# Patient Record
Sex: FEMALE | Race: OTHER | HISPANIC OR LATINO
[De-identification: names, ages, dates, MRNs, and addresses within clinical notes are randomized per-mention and may not be internally consistent; named-entity substitution may affect disease eponyms.]

---

## 2019-02-13 ENCOUNTER — TRANSCRIPTION ENCOUNTER (OUTPATIENT)
Age: 29
End: 2019-02-13

## 2020-02-18 ENCOUNTER — RESULT REVIEW (OUTPATIENT)
Age: 30
End: 2020-02-18

## 2021-02-23 ENCOUNTER — RESULT REVIEW (OUTPATIENT)
Age: 31
End: 2021-02-23

## 2021-05-27 PROBLEM — Z00.00 ENCOUNTER FOR PREVENTIVE HEALTH EXAMINATION: Status: ACTIVE | Noted: 2021-05-27

## 2021-06-02 ENCOUNTER — ASOB RESULT (OUTPATIENT)
Age: 31
End: 2021-06-02

## 2021-06-02 ENCOUNTER — APPOINTMENT (OUTPATIENT)
Dept: ANTEPARTUM | Facility: CLINIC | Age: 31
End: 2021-06-02
Payer: COMMERCIAL

## 2021-06-02 PROCEDURE — 76805 OB US >/= 14 WKS SNGL FETUS: CPT

## 2021-06-16 ENCOUNTER — APPOINTMENT (OUTPATIENT)
Dept: ANTEPARTUM | Facility: CLINIC | Age: 31
End: 2021-06-16
Payer: COMMERCIAL

## 2021-06-16 ENCOUNTER — ASOB RESULT (OUTPATIENT)
Age: 31
End: 2021-06-16

## 2021-06-16 PROCEDURE — 76816 OB US FOLLOW-UP PER FETUS: CPT

## 2021-08-23 ENCOUNTER — ASOB RESULT (OUTPATIENT)
Age: 31
End: 2021-08-23

## 2021-08-23 ENCOUNTER — NON-APPOINTMENT (OUTPATIENT)
Age: 31
End: 2021-08-23

## 2021-08-23 ENCOUNTER — APPOINTMENT (OUTPATIENT)
Dept: ANTEPARTUM | Facility: CLINIC | Age: 31
End: 2021-08-23
Payer: COMMERCIAL

## 2021-08-23 PROCEDURE — 76816 OB US FOLLOW-UP PER FETUS: CPT

## 2021-10-11 ENCOUNTER — OUTPATIENT (OUTPATIENT)
Dept: INPATIENT UNIT | Facility: HOSPITAL | Age: 31
LOS: 1 days | Discharge: HOME | End: 2021-10-11

## 2021-10-11 VITALS — DIASTOLIC BLOOD PRESSURE: 75 MMHG | SYSTOLIC BLOOD PRESSURE: 139 MMHG | HEART RATE: 75 BPM

## 2021-10-11 VITALS
RESPIRATION RATE: 17 BRPM | TEMPERATURE: 98 F | OXYGEN SATURATION: 100 % | DIASTOLIC BLOOD PRESSURE: 76 MMHG | SYSTOLIC BLOOD PRESSURE: 144 MMHG | HEART RATE: 66 BPM

## 2021-10-11 DIAGNOSIS — O26.899 OTHER SPECIFIED PREGNANCY RELATED CONDITIONS, UNSPECIFIED TRIMESTER: ICD-10-CM

## 2021-10-11 DIAGNOSIS — Z3A.00 WEEKS OF GESTATION OF PREGNANCY NOT SPECIFIED: ICD-10-CM

## 2021-10-11 DIAGNOSIS — Z90.89 ACQUIRED ABSENCE OF OTHER ORGANS: Chronic | ICD-10-CM

## 2021-10-11 NOTE — OB PROVIDER TRIAGE NOTE - NSOBPROVIDERNOTE_OBGYN_ALL_OB_FT
30 year old female P0 at 39.2 weeks gestation early labor    NST reactive  BPP 8/8    case d/w Dr Garcia   discharge home 30 year old female P0 at 39.2 weeks gestation early labor    NST reactive  BPP 8/8    case d/w Dr Garcia   discharge home  instructions given   s/s of labor reviewed     Luis Daniel PRESLEY

## 2021-10-11 NOTE — OB PROVIDER TRIAGE NOTE - NSHPPHYSICALEXAM_GEN_ALL_CORE
pt seen and examined    ICU Vital Signs Last 24 Hrs  T(C): 36.6 (11 Oct 2021 13:50), Max: 36.6 (11 Oct 2021 13:50)  T(F): 97.9 (11 Oct 2021 13:50), Max: 97.9 (11 Oct 2021 13:50)  HR: 75 (11 Oct 2021 14:54) (66 - 75)  BP: 139/75 (11 Oct 2021 14:54) (133/81 - 144/85)  BP(mean): --  ABP: --  ABP(mean): --  RR: 17 (11 Oct 2021 13:50) (17 - 17)  SpO2: 100% (11 Oct 2021 13:50) (100% - 100%)  pt alert OX3   lungs clear   heart s1 s2  abd soft   placed on efm

## 2021-10-11 NOTE — OB PROVIDER TRIAGE NOTE - HISTORY OF PRESENT ILLNESS
30 year old female P0 at 39.2 weeks who presents with contractions q 5 minutes and stated feeling Nausea and emesis this am    able to tolerate Gatoraide  in triage     denied any ap issues denied any fetal issues     medhx denied  surghx tonsillectomy 2000

## 2021-10-12 ENCOUNTER — TRANSCRIPTION ENCOUNTER (OUTPATIENT)
Age: 31
End: 2021-10-12

## 2021-10-13 ENCOUNTER — INPATIENT (INPATIENT)
Facility: HOSPITAL | Age: 31
LOS: 3 days | Discharge: HOME | End: 2021-10-17
Attending: SPECIALIST | Admitting: SPECIALIST
Payer: COMMERCIAL

## 2021-10-13 ENCOUNTER — RESULT REVIEW (OUTPATIENT)
Age: 31
End: 2021-10-13

## 2021-10-13 ENCOUNTER — APPOINTMENT (OUTPATIENT)
Dept: ANTEPARTUM | Facility: HOSPITAL | Age: 31
End: 2021-10-13

## 2021-10-13 ENCOUNTER — TRANSCRIPTION ENCOUNTER (OUTPATIENT)
Age: 31
End: 2021-10-13

## 2021-10-13 VITALS
SYSTOLIC BLOOD PRESSURE: 157 MMHG | HEART RATE: 74 BPM | DIASTOLIC BLOOD PRESSURE: 86 MMHG | RESPIRATION RATE: 15 BRPM | TEMPERATURE: 98 F

## 2021-10-13 DIAGNOSIS — O14.90 UNSPECIFIED PRE-ECLAMPSIA, UNSPECIFIED TRIMESTER: ICD-10-CM

## 2021-10-13 DIAGNOSIS — Z3A.00 WEEKS OF GESTATION OF PREGNANCY NOT SPECIFIED: ICD-10-CM

## 2021-10-13 DIAGNOSIS — O26.899 OTHER SPECIFIED PREGNANCY RELATED CONDITIONS, UNSPECIFIED TRIMESTER: ICD-10-CM

## 2021-10-13 DIAGNOSIS — O14.93 UNSPECIFIED PRE-ECLAMPSIA, THIRD TRIMESTER: ICD-10-CM

## 2021-10-13 DIAGNOSIS — Z90.89 ACQUIRED ABSENCE OF OTHER ORGANS: Chronic | ICD-10-CM

## 2021-10-13 PROBLEM — Z78.9 OTHER SPECIFIED HEALTH STATUS: Chronic | Status: ACTIVE | Noted: 2021-10-11

## 2021-10-13 LAB
ALBUMIN SERPL ELPH-MCNC: 3 G/DL — LOW (ref 3.3–5)
ALBUMIN SERPL ELPH-MCNC: 3.2 G/DL — LOW (ref 3.3–5)
ALBUMIN SERPL ELPH-MCNC: 3.8 G/DL — SIGNIFICANT CHANGE UP (ref 3.3–5)
ALBUMIN SERPL ELPH-MCNC: 4 G/DL — SIGNIFICANT CHANGE UP (ref 3.3–5)
ALP SERPL-CCNC: 118 U/L — SIGNIFICANT CHANGE UP (ref 40–120)
ALP SERPL-CCNC: 129 U/L — HIGH (ref 40–120)
ALP SERPL-CCNC: 150 U/L — HIGH (ref 40–120)
ALP SERPL-CCNC: 160 U/L — HIGH (ref 40–120)
ALT FLD-CCNC: 176 U/L — HIGH (ref 4–33)
ALT FLD-CCNC: 245 U/L — HIGH (ref 4–33)
ALT FLD-CCNC: 248 U/L — HIGH (ref 4–33)
ALT FLD-CCNC: 266 U/L — HIGH (ref 4–33)
ANION GAP SERPL CALC-SCNC: 14 MMOL/L — SIGNIFICANT CHANGE UP (ref 7–14)
ANION GAP SERPL CALC-SCNC: 15 MMOL/L — HIGH (ref 7–14)
ANION GAP SERPL CALC-SCNC: 19 MMOL/L — HIGH (ref 7–14)
ANION GAP SERPL CALC-SCNC: 19 MMOL/L — HIGH (ref 7–14)
ANISOCYTOSIS BLD QL: SLIGHT — SIGNIFICANT CHANGE UP
APPEARANCE UR: CLEAR — SIGNIFICANT CHANGE UP
APTT BLD: 22.9 SEC — LOW (ref 27–36.3)
APTT BLD: 23.8 SEC — LOW (ref 27–36.3)
APTT BLD: 24.4 SEC — LOW (ref 27–36.3)
APTT BLD: 26 SEC — LOW (ref 27–36.3)
AST SERPL-CCNC: 160 U/L — HIGH (ref 4–32)
AST SERPL-CCNC: 264 U/L — HIGH (ref 4–32)
AST SERPL-CCNC: 332 U/L — HIGH (ref 4–32)
AST SERPL-CCNC: 343 U/L — HIGH (ref 4–32)
BACTERIA # UR AUTO: ABNORMAL
BASOPHILS # BLD AUTO: 0.02 K/UL — SIGNIFICANT CHANGE UP (ref 0–0.2)
BASOPHILS # BLD AUTO: 0.03 K/UL — SIGNIFICANT CHANGE UP (ref 0–0.2)
BASOPHILS # BLD AUTO: 0.03 K/UL — SIGNIFICANT CHANGE UP (ref 0–0.2)
BASOPHILS # BLD AUTO: 0.04 K/UL — SIGNIFICANT CHANGE UP (ref 0–0.2)
BASOPHILS NFR BLD AUTO: 0.2 % — SIGNIFICANT CHANGE UP (ref 0–2)
BASOPHILS NFR BLD AUTO: 0.2 % — SIGNIFICANT CHANGE UP (ref 0–2)
BASOPHILS NFR BLD AUTO: 0.3 % — SIGNIFICANT CHANGE UP (ref 0–2)
BASOPHILS NFR BLD AUTO: 0.4 % — SIGNIFICANT CHANGE UP (ref 0–2)
BILIRUB SERPL-MCNC: 0.7 MG/DL — SIGNIFICANT CHANGE UP (ref 0.2–1.2)
BILIRUB SERPL-MCNC: 0.8 MG/DL — SIGNIFICANT CHANGE UP (ref 0.2–1.2)
BILIRUB UR-MCNC: NEGATIVE — SIGNIFICANT CHANGE UP
BLD GP AB SCN SERPL QL: NEGATIVE — SIGNIFICANT CHANGE UP
BUN SERPL-MCNC: 10 MG/DL — SIGNIFICANT CHANGE UP (ref 7–23)
BUN SERPL-MCNC: 11 MG/DL — SIGNIFICANT CHANGE UP (ref 7–23)
BUN SERPL-MCNC: 14 MG/DL — SIGNIFICANT CHANGE UP (ref 7–23)
BUN SERPL-MCNC: 9 MG/DL — SIGNIFICANT CHANGE UP (ref 7–23)
CALCIUM SERPL-MCNC: 6.5 MG/DL — CRITICAL LOW (ref 8.4–10.5)
CALCIUM SERPL-MCNC: 7 MG/DL — LOW (ref 8.4–10.5)
CALCIUM SERPL-MCNC: 8.3 MG/DL — LOW (ref 8.4–10.5)
CALCIUM SERPL-MCNC: 9.5 MG/DL — SIGNIFICANT CHANGE UP (ref 8.4–10.5)
CHLORIDE SERPL-SCNC: 100 MMOL/L — SIGNIFICANT CHANGE UP (ref 98–107)
CHLORIDE SERPL-SCNC: 102 MMOL/L — SIGNIFICANT CHANGE UP (ref 98–107)
CHLORIDE SERPL-SCNC: 103 MMOL/L — SIGNIFICANT CHANGE UP (ref 98–107)
CHLORIDE SERPL-SCNC: 104 MMOL/L — SIGNIFICANT CHANGE UP (ref 98–107)
CO2 SERPL-SCNC: 13 MMOL/L — LOW (ref 22–31)
CO2 SERPL-SCNC: 14 MMOL/L — LOW (ref 22–31)
CO2 SERPL-SCNC: 17 MMOL/L — LOW (ref 22–31)
CO2 SERPL-SCNC: 18 MMOL/L — LOW (ref 22–31)
COLOR SPEC: YELLOW — SIGNIFICANT CHANGE UP
COVID-19 SPIKE DOMAIN AB INTERP: NEGATIVE — SIGNIFICANT CHANGE UP
COVID-19 SPIKE DOMAIN ANTIBODY RESULT: 0.4 U/ML — SIGNIFICANT CHANGE UP
CREAT ?TM UR-MCNC: 180 MG/DL — SIGNIFICANT CHANGE UP
CREAT SERPL-MCNC: 0.87 MG/DL — SIGNIFICANT CHANGE UP (ref 0.5–1.3)
CREAT SERPL-MCNC: 0.88 MG/DL — SIGNIFICANT CHANGE UP (ref 0.5–1.3)
CREAT SERPL-MCNC: 0.93 MG/DL — SIGNIFICANT CHANGE UP (ref 0.5–1.3)
CREAT SERPL-MCNC: 0.93 MG/DL — SIGNIFICANT CHANGE UP (ref 0.5–1.3)
DIFF PNL FLD: ABNORMAL
EOSINOPHIL # BLD AUTO: 0 K/UL — SIGNIFICANT CHANGE UP (ref 0–0.5)
EOSINOPHIL # BLD AUTO: 0.31 K/UL — SIGNIFICANT CHANGE UP (ref 0–0.5)
EOSINOPHIL NFR BLD AUTO: 0 % — SIGNIFICANT CHANGE UP (ref 0–6)
EOSINOPHIL NFR BLD AUTO: 2.6 % — SIGNIFICANT CHANGE UP (ref 0–6)
EPI CELLS # UR: 5 /HPF — SIGNIFICANT CHANGE UP (ref 0–5)
FIBRINOGEN PPP-MCNC: 478 MG/DL — SIGNIFICANT CHANGE UP (ref 290–520)
FIBRINOGEN PPP-MCNC: 631 MG/DL — HIGH (ref 290–520)
FIBRINOGEN PPP-MCNC: 687 MG/DL — HIGH (ref 290–520)
GLUCOSE SERPL-MCNC: 137 MG/DL — HIGH (ref 70–99)
GLUCOSE SERPL-MCNC: 153 MG/DL — HIGH (ref 70–99)
GLUCOSE SERPL-MCNC: 77 MG/DL — SIGNIFICANT CHANGE UP (ref 70–99)
GLUCOSE SERPL-MCNC: 85 MG/DL — SIGNIFICANT CHANGE UP (ref 70–99)
GLUCOSE UR QL: NEGATIVE — SIGNIFICANT CHANGE UP
HCT VFR BLD CALC: 22.8 % — LOW (ref 34.5–45)
HCT VFR BLD CALC: 25.9 % — LOW (ref 34.5–45)
HCT VFR BLD CALC: 31.9 % — LOW (ref 34.5–45)
HCT VFR BLD CALC: 34.3 % — LOW (ref 34.5–45)
HGB BLD-MCNC: 10.2 G/DL — LOW (ref 11.5–15.5)
HGB BLD-MCNC: 11 G/DL — LOW (ref 11.5–15.5)
HGB BLD-MCNC: 7.6 G/DL — LOW (ref 11.5–15.5)
HGB BLD-MCNC: 8.3 G/DL — LOW (ref 11.5–15.5)
HYALINE CASTS # UR AUTO: 6 /LPF — SIGNIFICANT CHANGE UP (ref 0–7)
IANC: 10.12 K/UL — HIGH (ref 1.5–8.5)
IANC: 10.14 K/UL — HIGH (ref 1.5–8.5)
IANC: 7.43 K/UL — SIGNIFICANT CHANGE UP (ref 1.5–8.5)
IANC: 8.4 K/UL — SIGNIFICANT CHANGE UP (ref 1.5–8.5)
IMM GRANULOCYTES NFR BLD AUTO: 1.2 % — SIGNIFICANT CHANGE UP (ref 0–1.5)
IMM GRANULOCYTES NFR BLD AUTO: 1.2 % — SIGNIFICANT CHANGE UP (ref 0–1.5)
IMM GRANULOCYTES NFR BLD AUTO: 1.4 % — SIGNIFICANT CHANGE UP (ref 0–1.5)
IMM GRANULOCYTES NFR BLD AUTO: 2.4 % — HIGH (ref 0–1.5)
INR BLD: 0.96 RATIO — SIGNIFICANT CHANGE UP (ref 0.88–1.16)
INR BLD: 1.02 RATIO — SIGNIFICANT CHANGE UP (ref 0.88–1.16)
INR BLD: 1.03 RATIO — SIGNIFICANT CHANGE UP (ref 0.88–1.16)
INR BLD: 1.13 RATIO — SIGNIFICANT CHANGE UP (ref 0.88–1.16)
KETONES UR-MCNC: ABNORMAL
LDH SERPL L TO P-CCNC: 419 U/L — HIGH (ref 135–225)
LDH SERPL L TO P-CCNC: 555 U/L — HIGH (ref 135–225)
LDH SERPL L TO P-CCNC: 767 U/L — HIGH (ref 135–225)
LDH SERPL L TO P-CCNC: 790 U/L — HIGH (ref 135–225)
LEUKOCYTE ESTERASE UR-ACNC: NEGATIVE — SIGNIFICANT CHANGE UP
LG PLATELETS BLD QL AUTO: SLIGHT — SIGNIFICANT CHANGE UP
LYMPHOCYTES # BLD AUTO: 0.54 K/UL — LOW (ref 1–3.3)
LYMPHOCYTES # BLD AUTO: 0.55 K/UL — LOW (ref 1–3.3)
LYMPHOCYTES # BLD AUTO: 0.84 K/UL — LOW (ref 1–3.3)
LYMPHOCYTES # BLD AUTO: 1.14 K/UL — SIGNIFICANT CHANGE UP (ref 1–3.3)
LYMPHOCYTES # BLD AUTO: 10.9 % — LOW (ref 13–44)
LYMPHOCYTES # BLD AUTO: 4.5 % — LOW (ref 13–44)
LYMPHOCYTES # BLD AUTO: 4.7 % — LOW (ref 13–44)
LYMPHOCYTES # BLD AUTO: 9.1 % — LOW (ref 13–44)
MAGNESIUM SERPL-MCNC: 5.6 MG/DL — HIGH (ref 1.6–2.6)
MAGNESIUM SERPL-MCNC: 6.5 MG/DL — HIGH (ref 1.6–2.6)
MAGNESIUM SERPL-MCNC: 6.6 MG/DL — HIGH (ref 1.6–2.6)
MANUAL SMEAR VERIFICATION: SIGNIFICANT CHANGE UP
MCHC RBC-ENTMCNC: 27.5 PG — SIGNIFICANT CHANGE UP (ref 27–34)
MCHC RBC-ENTMCNC: 27.6 PG — SIGNIFICANT CHANGE UP (ref 27–34)
MCHC RBC-ENTMCNC: 28 PG — SIGNIFICANT CHANGE UP (ref 27–34)
MCHC RBC-ENTMCNC: 28.1 PG — SIGNIFICANT CHANGE UP (ref 27–34)
MCHC RBC-ENTMCNC: 32 GM/DL — SIGNIFICANT CHANGE UP (ref 32–36)
MCHC RBC-ENTMCNC: 32 GM/DL — SIGNIFICANT CHANGE UP (ref 32–36)
MCHC RBC-ENTMCNC: 32.1 GM/DL — SIGNIFICANT CHANGE UP (ref 32–36)
MCHC RBC-ENTMCNC: 33.3 GM/DL — SIGNIFICANT CHANGE UP (ref 32–36)
MCV RBC AUTO: 84.4 FL — SIGNIFICANT CHANGE UP (ref 80–100)
MCV RBC AUTO: 86 FL — SIGNIFICANT CHANGE UP (ref 80–100)
MCV RBC AUTO: 86.2 FL — SIGNIFICANT CHANGE UP (ref 80–100)
MCV RBC AUTO: 87.5 FL — SIGNIFICANT CHANGE UP (ref 80–100)
MICROCYTES BLD QL: SLIGHT — SIGNIFICANT CHANGE UP
MONOCYTES # BLD AUTO: 0.72 K/UL — SIGNIFICANT CHANGE UP (ref 0–0.9)
MONOCYTES # BLD AUTO: 0.73 K/UL — SIGNIFICANT CHANGE UP (ref 0–0.9)
MONOCYTES # BLD AUTO: 0.74 K/UL — SIGNIFICANT CHANGE UP (ref 0–0.9)
MONOCYTES # BLD AUTO: 0.93 K/UL — HIGH (ref 0–0.9)
MONOCYTES NFR BLD AUTO: 6.3 % — SIGNIFICANT CHANGE UP (ref 2–14)
MONOCYTES NFR BLD AUTO: 6.9 % — SIGNIFICANT CHANGE UP (ref 2–14)
MONOCYTES NFR BLD AUTO: 7.7 % — SIGNIFICANT CHANGE UP (ref 2–14)
MONOCYTES NFR BLD AUTO: 8 % — SIGNIFICANT CHANGE UP (ref 2–14)
NEUTROPHILS # BLD AUTO: 10.12 K/UL — HIGH (ref 1.8–7.4)
NEUTROPHILS # BLD AUTO: 10.14 K/UL — HIGH (ref 1.8–7.4)
NEUTROPHILS # BLD AUTO: 7.43 K/UL — HIGH (ref 1.8–7.4)
NEUTROPHILS # BLD AUTO: 8.4 K/UL — HIGH (ref 1.8–7.4)
NEUTROPHILS NFR BLD AUTO: 80.2 % — HIGH (ref 43–77)
NEUTROPHILS NFR BLD AUTO: 80.6 % — HIGH (ref 43–77)
NEUTROPHILS NFR BLD AUTO: 83.8 % — HIGH (ref 43–77)
NEUTROPHILS NFR BLD AUTO: 87.4 % — HIGH (ref 43–77)
NITRITE UR-MCNC: NEGATIVE — SIGNIFICANT CHANGE UP
NRBC # BLD: 0 /100 WBCS — SIGNIFICANT CHANGE UP
NRBC # FLD: 0 K/UL — SIGNIFICANT CHANGE UP
NRBC # FLD: 0.02 K/UL — HIGH
PH UR: 6 — SIGNIFICANT CHANGE UP (ref 5–8)
PLAT MORPH BLD: ABNORMAL
PLATELET # BLD AUTO: 34 K/UL — LOW (ref 150–400)
PLATELET # BLD AUTO: 47 K/UL — LOW (ref 150–400)
PLATELET # BLD AUTO: 47 K/UL — LOW (ref 150–400)
PLATELET # BLD AUTO: 70 K/UL — LOW (ref 150–400)
PLATELET COUNT - ESTIMATE: ABNORMAL
POTASSIUM SERPL-MCNC: 4 MMOL/L — SIGNIFICANT CHANGE UP (ref 3.5–5.3)
POTASSIUM SERPL-MCNC: 4.1 MMOL/L — SIGNIFICANT CHANGE UP (ref 3.5–5.3)
POTASSIUM SERPL-MCNC: 4.4 MMOL/L — SIGNIFICANT CHANGE UP (ref 3.5–5.3)
POTASSIUM SERPL-MCNC: 4.8 MMOL/L — SIGNIFICANT CHANGE UP (ref 3.5–5.3)
POTASSIUM SERPL-SCNC: 4 MMOL/L — SIGNIFICANT CHANGE UP (ref 3.5–5.3)
POTASSIUM SERPL-SCNC: 4.1 MMOL/L — SIGNIFICANT CHANGE UP (ref 3.5–5.3)
POTASSIUM SERPL-SCNC: 4.4 MMOL/L — SIGNIFICANT CHANGE UP (ref 3.5–5.3)
POTASSIUM SERPL-SCNC: 4.8 MMOL/L — SIGNIFICANT CHANGE UP (ref 3.5–5.3)
PROT ?TM UR-MCNC: 68 MG/DL — SIGNIFICANT CHANGE UP
PROT ?TM UR-MCNC: 68 MG/DL — SIGNIFICANT CHANGE UP
PROT SERPL-MCNC: 5.1 G/DL — LOW (ref 6–8.3)
PROT SERPL-MCNC: 5.2 G/DL — LOW (ref 6–8.3)
PROT SERPL-MCNC: 6.6 G/DL — SIGNIFICANT CHANGE UP (ref 6–8.3)
PROT SERPL-MCNC: 6.7 G/DL — SIGNIFICANT CHANGE UP (ref 6–8.3)
PROT UR-MCNC: ABNORMAL
PROT/CREAT UR-RTO: 0.4 RATIO — HIGH (ref 0–0.2)
PROTHROM AB SERPL-ACNC: 11.1 SEC — SIGNIFICANT CHANGE UP (ref 10.6–13.6)
PROTHROM AB SERPL-ACNC: 11.6 SEC — SIGNIFICANT CHANGE UP (ref 10.6–13.6)
PROTHROM AB SERPL-ACNC: 11.8 SEC — SIGNIFICANT CHANGE UP (ref 10.6–13.6)
PROTHROM AB SERPL-ACNC: 12.8 SEC — SIGNIFICANT CHANGE UP (ref 10.6–13.6)
RBC # BLD: 2.7 M/UL — LOW (ref 3.8–5.2)
RBC # BLD: 2.96 M/UL — LOW (ref 3.8–5.2)
RBC # BLD: 3.71 M/UL — LOW (ref 3.8–5.2)
RBC # BLD: 3.98 M/UL — SIGNIFICANT CHANGE UP (ref 3.8–5.2)
RBC # FLD: 13.8 % — SIGNIFICANT CHANGE UP (ref 10.3–14.5)
RBC # FLD: 14.1 % — SIGNIFICANT CHANGE UP (ref 10.3–14.5)
RBC # FLD: 14.2 % — SIGNIFICANT CHANGE UP (ref 10.3–14.5)
RBC # FLD: 14.2 % — SIGNIFICANT CHANGE UP (ref 10.3–14.5)
RBC BLD AUTO: ABNORMAL
RBC CASTS # UR COMP ASSIST: 0 /HPF — SIGNIFICANT CHANGE UP (ref 0–4)
RH IG SCN BLD-IMP: POSITIVE — SIGNIFICANT CHANGE UP
RH IG SCN BLD-IMP: POSITIVE — SIGNIFICANT CHANGE UP
SARS-COV-2 IGG+IGM SERPL QL IA: 0.4 U/ML — SIGNIFICANT CHANGE UP
SARS-COV-2 IGG+IGM SERPL QL IA: NEGATIVE — SIGNIFICANT CHANGE UP
SARS-COV-2 RNA SPEC QL NAA+PROBE: SIGNIFICANT CHANGE UP
SODIUM SERPL-SCNC: 132 MMOL/L — LOW (ref 135–145)
SODIUM SERPL-SCNC: 134 MMOL/L — LOW (ref 135–145)
SODIUM SERPL-SCNC: 136 MMOL/L — SIGNIFICANT CHANGE UP (ref 135–145)
SODIUM SERPL-SCNC: 136 MMOL/L — SIGNIFICANT CHANGE UP (ref 135–145)
SP GR SPEC: 1.03 — SIGNIFICANT CHANGE UP (ref 1–1.05)
T PALLIDUM AB TITR SER: NEGATIVE — SIGNIFICANT CHANGE UP
URATE SERPL-MCNC: 5.4 MG/DL — SIGNIFICANT CHANGE UP (ref 2.5–7)
URATE SERPL-MCNC: 5.8 MG/DL — SIGNIFICANT CHANGE UP (ref 2.5–7)
URATE SERPL-MCNC: 5.9 MG/DL — SIGNIFICANT CHANGE UP (ref 2.5–7)
URATE SERPL-MCNC: 6.2 MG/DL — SIGNIFICANT CHANGE UP (ref 2.5–7)
UROBILINOGEN FLD QL: SIGNIFICANT CHANGE UP
WBC # BLD: 10.43 K/UL — SIGNIFICANT CHANGE UP (ref 3.8–10.5)
WBC # BLD: 11.58 K/UL — HIGH (ref 3.8–10.5)
WBC # BLD: 12.09 K/UL — HIGH (ref 3.8–10.5)
WBC # BLD: 9.26 K/UL — SIGNIFICANT CHANGE UP (ref 3.8–10.5)
WBC # FLD AUTO: 10.43 K/UL — SIGNIFICANT CHANGE UP (ref 3.8–10.5)
WBC # FLD AUTO: 11.58 K/UL — HIGH (ref 3.8–10.5)
WBC # FLD AUTO: 12.09 K/UL — HIGH (ref 3.8–10.5)
WBC # FLD AUTO: 9.26 K/UL — SIGNIFICANT CHANGE UP (ref 3.8–10.5)
WBC UR QL: 7 /HPF — HIGH (ref 0–5)

## 2021-10-13 PROCEDURE — 99222 1ST HOSP IP/OBS MODERATE 55: CPT

## 2021-10-13 PROCEDURE — 88307 TISSUE EXAM BY PATHOLOGIST: CPT | Mod: 26

## 2021-10-13 RX ORDER — LANOLIN
1 OINTMENT (GRAM) TOPICAL EVERY 6 HOURS
Refills: 0 | Status: DISCONTINUED | OUTPATIENT
Start: 2021-10-13 | End: 2021-10-17

## 2021-10-13 RX ORDER — SODIUM CHLORIDE 9 MG/ML
1000 INJECTION, SOLUTION INTRAVENOUS
Refills: 0 | Status: DISCONTINUED | OUTPATIENT
Start: 2021-10-13 | End: 2021-10-14

## 2021-10-13 RX ORDER — CITRIC ACID/SODIUM CITRATE 300-500 MG
30 SOLUTION, ORAL ORAL ONCE
Refills: 0 | Status: COMPLETED | OUTPATIENT
Start: 2021-10-13 | End: 2021-10-13

## 2021-10-13 RX ORDER — SODIUM CHLORIDE 9 MG/ML
1000 INJECTION, SOLUTION INTRAVENOUS
Refills: 0 | Status: DISCONTINUED | OUTPATIENT
Start: 2021-10-13 | End: 2021-10-13

## 2021-10-13 RX ORDER — ACETAMINOPHEN 500 MG
3 TABLET ORAL
Qty: 0 | Refills: 0 | DISCHARGE
Start: 2021-10-13

## 2021-10-13 RX ORDER — OXYTOCIN 10 UNIT/ML
333.33 VIAL (ML) INJECTION
Qty: 20 | Refills: 0 | Status: DISCONTINUED | OUTPATIENT
Start: 2021-10-13 | End: 2021-10-13

## 2021-10-13 RX ORDER — MAGNESIUM SULFATE 500 MG/ML
4 VIAL (ML) INJECTION ONCE
Refills: 0 | Status: COMPLETED | OUTPATIENT
Start: 2021-10-13 | End: 2021-10-13

## 2021-10-13 RX ORDER — FAMOTIDINE 10 MG/ML
20 INJECTION INTRAVENOUS ONCE
Refills: 0 | Status: COMPLETED | OUTPATIENT
Start: 2021-10-13 | End: 2021-10-13

## 2021-10-13 RX ORDER — SODIUM CHLORIDE 9 MG/ML
500 INJECTION, SOLUTION INTRAVENOUS ONCE
Refills: 0 | Status: COMPLETED | OUTPATIENT
Start: 2021-10-13 | End: 2021-10-13

## 2021-10-13 RX ORDER — ACETAMINOPHEN 500 MG
975 TABLET ORAL
Refills: 0 | Status: DISCONTINUED | OUTPATIENT
Start: 2021-10-13 | End: 2021-10-13

## 2021-10-13 RX ORDER — MAGNESIUM SULFATE 500 MG/ML
2 VIAL (ML) INJECTION
Qty: 40 | Refills: 0 | Status: DISCONTINUED | OUTPATIENT
Start: 2021-10-13 | End: 2021-10-13

## 2021-10-13 RX ORDER — SODIUM CHLORIDE 9 MG/ML
1000 INJECTION, SOLUTION INTRAVENOUS ONCE
Refills: 0 | Status: COMPLETED | OUTPATIENT
Start: 2021-10-13 | End: 2021-10-13

## 2021-10-13 RX ORDER — DIPHENHYDRAMINE HCL 50 MG
25 CAPSULE ORAL EVERY 6 HOURS
Refills: 0 | Status: DISCONTINUED | OUTPATIENT
Start: 2021-10-13 | End: 2021-10-17

## 2021-10-13 RX ORDER — ACETAMINOPHEN 500 MG
1000 TABLET ORAL ONCE
Refills: 0 | Status: COMPLETED | OUTPATIENT
Start: 2021-10-13 | End: 2021-10-13

## 2021-10-13 RX ORDER — OXYCODONE HYDROCHLORIDE 5 MG/1
5 TABLET ORAL
Refills: 0 | Status: COMPLETED | OUTPATIENT
Start: 2021-10-13 | End: 2021-10-20

## 2021-10-13 RX ORDER — MAGNESIUM SULFATE 500 MG/ML
2 VIAL (ML) INJECTION
Qty: 40 | Refills: 0 | Status: DISCONTINUED | OUTPATIENT
Start: 2021-10-13 | End: 2021-10-14

## 2021-10-13 RX ORDER — MAGNESIUM HYDROXIDE 400 MG/1
30 TABLET, CHEWABLE ORAL
Refills: 0 | Status: DISCONTINUED | OUTPATIENT
Start: 2021-10-13 | End: 2021-10-17

## 2021-10-13 RX ORDER — DIPHENHYDRAMINE HCL 50 MG
25 CAPSULE ORAL ONCE
Refills: 0 | Status: DISCONTINUED | OUTPATIENT
Start: 2021-10-13 | End: 2021-10-13

## 2021-10-13 RX ORDER — NIFEDIPINE 30 MG
30 TABLET, EXTENDED RELEASE 24 HR ORAL DAILY
Refills: 0 | Status: DISCONTINUED | OUTPATIENT
Start: 2021-10-13 | End: 2021-10-13

## 2021-10-13 RX ORDER — OXYTOCIN 10 UNIT/ML
16.67 VIAL (ML) INJECTION
Qty: 20 | Refills: 0 | Status: DISCONTINUED | OUTPATIENT
Start: 2021-10-13 | End: 2021-10-14

## 2021-10-13 RX ORDER — TETANUS TOXOID, REDUCED DIPHTHERIA TOXOID AND ACELLULAR PERTUSSIS VACCINE, ADSORBED 5; 2.5; 8; 8; 2.5 [IU]/.5ML; [IU]/.5ML; UG/.5ML; UG/.5ML; UG/.5ML
0.5 SUSPENSION INTRAMUSCULAR ONCE
Refills: 0 | Status: DISCONTINUED | OUTPATIENT
Start: 2021-10-13 | End: 2021-10-17

## 2021-10-13 RX ORDER — OXYCODONE HYDROCHLORIDE 5 MG/1
5 TABLET ORAL ONCE
Refills: 0 | Status: DISCONTINUED | OUTPATIENT
Start: 2021-10-13 | End: 2021-10-15

## 2021-10-13 RX ORDER — NIFEDIPINE 30 MG
30 TABLET, EXTENDED RELEASE 24 HR ORAL DAILY
Refills: 0 | Status: DISCONTINUED | OUTPATIENT
Start: 2021-10-14 | End: 2021-10-14

## 2021-10-13 RX ORDER — DIPHENHYDRAMINE HCL 50 MG
25 CAPSULE ORAL ONCE
Refills: 0 | Status: COMPLETED | OUTPATIENT
Start: 2021-10-13 | End: 2021-10-13

## 2021-10-13 RX ORDER — SIMETHICONE 80 MG/1
80 TABLET, CHEWABLE ORAL EVERY 4 HOURS
Refills: 0 | Status: DISCONTINUED | OUTPATIENT
Start: 2021-10-13 | End: 2021-10-17

## 2021-10-13 RX ORDER — DIPHENOXYLATE HCL/ATROPINE 2.5-.025MG
2 TABLET ORAL ONCE
Refills: 0 | Status: DISCONTINUED | OUTPATIENT
Start: 2021-10-13 | End: 2021-10-13

## 2021-10-13 RX ADMIN — Medication 50 MILLIUNIT(S)/MIN: at 15:49

## 2021-10-13 RX ADMIN — Medication 300 GRAM(S): at 06:47

## 2021-10-13 RX ADMIN — Medication 25 MILLIGRAM(S): at 05:20

## 2021-10-13 RX ADMIN — Medication 50 GM/HR: at 07:11

## 2021-10-13 RX ADMIN — FAMOTIDINE 20 MILLIGRAM(S): 10 INJECTION INTRAVENOUS at 13:37

## 2021-10-13 RX ADMIN — FAMOTIDINE 20 MILLIGRAM(S): 10 INJECTION INTRAVENOUS at 07:57

## 2021-10-13 RX ADMIN — SODIUM CHLORIDE 1000 MILLILITER(S): 9 INJECTION, SOLUTION INTRAVENOUS at 08:39

## 2021-10-13 RX ADMIN — Medication 400 MILLIGRAM(S): at 17:45

## 2021-10-13 RX ADMIN — Medication 2 TABLET(S): at 16:00

## 2021-10-13 RX ADMIN — SODIUM CHLORIDE 50 MILLILITER(S): 9 INJECTION, SOLUTION INTRAVENOUS at 08:39

## 2021-10-13 RX ADMIN — SODIUM CHLORIDE 2000 MILLILITER(S): 9 INJECTION, SOLUTION INTRAVENOUS at 04:53

## 2021-10-13 RX ADMIN — Medication 30 MILLILITER(S): at 13:37

## 2021-10-13 RX ADMIN — Medication 50 GM/HR: at 19:18

## 2021-10-13 RX ADMIN — Medication 1000 MILLIGRAM(S): at 18:00

## 2021-10-13 RX ADMIN — Medication 30 MILLIGRAM(S): at 06:55

## 2021-10-13 NOTE — OB RN DELIVERY SUMMARY - NSSELHIDDEN_OBGYN_ALL_OB_FT
[NS_DeliveryAttending1_OBGYN_ALL_OB_FT:[id number]],[NS_DeliveryAssist1_OBGYN_ALL_OB_FT:[id number]],[NS_DeliveryRN_OBGYN_ALL_OB_FT:[id number]]

## 2021-10-13 NOTE — CHART NOTE - NSCHARTNOTEFT_GEN_A_CORE
R4  Patient seen at bedside with attending. Explained dx of HELLP to patient and importance of SICU evaluation.  Plts currently downtrending, most recently 34 despite transfusion of 1u plt. LFTs uptrending, most recently 332/248. Fibrinogen also downtrending, 478.  Tachycardia improving. Normotensive and adequate UOP at this time.  SICU coming for evaluation. Will transfuse 2u PRBCs, 2u plts, 2 FFP. Will continue to trend labs awaiting SICU evaluation.    VILLA Zhang PGY4  seen with Dr. Fleming R4  Patient seen at bedside with attending. Explained dx of HELLP to patient and importance of SICU evaluation.  Plts currently downtrending, most recently 34 despite transfusion of 1u plt. LFTs uptrending, most recently 332/248. Fibrinogen also downtrending, 478.  Tachycardia improving. Normotensive and adequate UOP at this time.  Patient evaluated by SICU and will be taken to SICU when a bed becomes available.  In the meantime, will transfuse 2u PRBCs, 2u plts, 2 FFP. Per SICU, will repeat labs 2h after completion of PRBC and Plt transfusion, and q4h thereafter.  Strict Is/Os  All questions answered.    VILLA Zhang PGY4  seen with Dr. Fleming  pt d/w MFM at safety rounds

## 2021-10-13 NOTE — OB PROVIDER H&P - PROBLEM SELECTOR PLAN 1
d/w Dr Frankel/Dr Crawford admit for labile BP, elevated LFTs n/v for Magnesium, procardia IOL @ 39.4wks  Magnesium bolus and continuous infusion for PEC  Procardia for PEC  for PO Cytotec for IOL  covid sent  prenatals reviewed  pain management as needed  repeat HELLP labs  monitor BPs

## 2021-10-13 NOTE — OB PROVIDER DELIVERY SUMMARY - NSSELHIDDEN_OBGYN_ALL_OB_FT
[NS_DeliveryAttending1_OBGYN_ALL_OB_FT:[id number]],[NS_DeliveryAssist1_OBGYN_ALL_OB_FT:[id number]],[NS_DeliveryRN_OBGYN_ALL_OB_FT:[id number]==]

## 2021-10-13 NOTE — OB PROVIDER LABOR PROGRESS NOTE - ASSESSMENT
HELLP syndrome; Dr Dean was updated on patient's status; anesthesiologist Dr Lobo was updated; will evaluate the patient at bedside.  Continue labor induction with PO Cytotec.    Janice Ocasio CNM

## 2021-10-13 NOTE — PROGRESS NOTE ADULT - SUBJECTIVE AND OBJECTIVE BOX
M Fellow Consult Note    HPI: 29yo P1 at 39w4d who is currently undergoing an IOL for severe preeclampsia/HELLP. M consulted for role of expedited delivery given still early in induction process and her laboratories are worsening. In review she presented to triage this monday w/ symptoms of nausea and vomiting in addition to labor complaints. She had normal to mild range blood pressures in triage. PEC labs were not sent. Fetal status was reassuring on EFM. She was discharged home w/ precautions. She was managing her nausea/vomiting w/ zofran at home but was becoming worse last night into today which prompted her presentation. Here she continued to complain of n/v/abd pain. Labs were significant for thrombocytopenia 70, LFTs 150/160 Hct 34.3. She had severe range pressures at this time recieved procardia 30mg XLShe received an epidural at that time and iol was started she is still unfavorable w/ cervix ft-1cm. Repeat labs significant for worsening thrombocytopenia to 47, Na 132, LFTs 264/245. On our evaluation pt continues w/ abdominal pain and nausea w/o toleration of any PO since yesterday evening. +FM denies LOF/VB. Denies ha/cov.        Physical Exam  ICU Vital Signs Last 24 Hrs  T(C): 36.6 (13 Oct 2021 10:59), Max: 37.0 (13 Oct 2021 07:00)  T(F): 97.88 (13 Oct 2021 10:59), Max: 98.6 (13 Oct 2021 07:00)  HR: 113 (13 Oct 2021 13:38) (70 - 123)  BP: 139/84 (13 Oct 2021 13:29) (120/62 - 168/80)  BP(mean): --  ABP: --  ABP(mean): --  RR: 18 (13 Oct 2021 07:05) (15 - 18)  SpO2: 98% (13 Oct 2021 13:38) (98% - 100%)  Gen: well appearing  Abd: firm uterus, palpable pain  Neuro: +4 reflexes bilaterally               10.2   9.26  )-----------( 47       ( 13 Oct 2021 11:08 )             31.9     10-13    132<L>  |  100  |  11  ----------------------------<  77  4.4   |  18<L>  |  0.88    Ca    8.3<L>      13 Oct 2021 11:08  Mg     5.60     10-13    TPro  6.7  /  Alb  4.0  /  TBili  0.8  /  DBili  x   /  AST  264<H>  /  ALT  245<H>  /  AlkPhos  160<H>  10-13      Bedside sonogram:  No free fluid at renal/hepatic interface. No evidence of hypoechoic regions retroplacentally.

## 2021-10-13 NOTE — OB PROVIDER TRIAGE NOTE - NSHPPHYSICALEXAM_GEN_ALL_CORE
TAS: vertex  SVE: 0.5/50/-3  FHT: moderate variability, + accels, negative decels  toco: irreg q 4-6 minutes  Vital Signs Last 24 Hrs  T(C): 36.5 (13 Oct 2021 03:49), Max: 36.5 (13 Oct 2021 03:49)  T(F): 97.7 (13 Oct 2021 03:49), Max: 97.7 (13 Oct 2021 03:49)  HR: 76 (13 Oct 2021 04:26) (74 - 76)  BP: 149/87 (13 Oct 2021 04:11) (149/87 - 157/86)  BP(mean): --  RR: 15 (13 Oct 2021 03:49) (15 - 15)  SpO2: -- abd soft gravid NT  LS clear bilaterally  CV RRR  TAS: vertex  posterior placenta  BPP 8/8  RUTH:12.6  SVE: 0.5/50/-3  FHT: moderate variability, + accels, negative decels  toco: irreg q 4-6 minutes  Vital Signs Last 24 Hrs  T(C): 36.5 (13 Oct 2021 03:49), Max: 36.5 (13 Oct 2021 03:49)  T(F): 97.7 (13 Oct 2021 03:49), Max: 97.7 (13 Oct 2021 03:49)  HR: 76 (13 Oct 2021 04:26) (74 - 76)  BP: 149/87 (13 Oct 2021 04:11) (149/87 - 157/86)  BP(mean): --  RR: 15 (13 Oct 2021 03:49) (15 - 15)  SpO2: --

## 2021-10-13 NOTE — DISCHARGE NOTE OB - MATERIALS PROVIDED
Vaccinations/[facility]  Screening Program/  Immunization Record/Breastfeeding Log/Bottle Feeding Log/Breastfeeding Mother’s Support Group Information/Guide to Postpartum Care/[facility] Hearing Screen Program/Back To Sleep Handout/Shaken Baby Prevention Handout/Breastfeeding Guide and Packet/Birth Certificate Instructions/Discharge Medication Information for Patients and Families Pocket Guide

## 2021-10-13 NOTE — CONSULT NOTE ADULT - ASSESSMENT
ASSESSMENT:  {{0:name}} is a {{1:age}} {{2:gender}} with history of ****. Presented with **** on ****. Now POD# **** from ****. SICU consulted for ****.     PLAN:  NEURO:  -SAT/RASS/CAM-ICU/pain  -monitor mental status  -tylenol q6  -toradol per primary team  -oxy 5-10 mg q4 prn  -dilaudid for breakthrough pain  -OOBTC if able  -Imaging?  -Sedation?    RESPIRATORY:   -monitor respiratory status  -continuous pulse oxt  -SBT?/P:F?  -PT/OT orders?  -f/u abg? CXR?  -OOBTC?    CARDIOVASCULAR:  -echo?  -home meds?  -pressors?  -monitor VS and perfusion status  -trend lactate?    GI/NUTRITION:  -NG/OG tube? output?  -feeds? - may start CLD day of surgery often (give ensure x1 with meal)  -antiemetic?  -GI ppx? (if intubated >48 hrs)  -BM/flatus?  -if not s/p abd surgery, may start bowel regimen (ie docusate 100mg bid or miralax)    GENITOURINARY/RENAL:  -output? -UOP>30cc/hr?  -howard? DC howard day 1 or 2 if able  -IV fluids? - may generally dc if eating  -monitor electrolytes  -CrCl?    HEMATOLOGIC:  -monitor H&H  -DVT ppx? - SCDs vs heparin/lovenox    INFECTIOUS DISEASE:  -abx? start and stop date?  -BCx/UCx/suputum - w/ dates  -monitor for fevers; trend WBC    ENDOCRINE:  -monitor glucose - how often?  -glucose trend?  -ISS? pre-meal? basal?    Lines:  PIV x ?  howard? w/ date  a-line? w/ date  CVC? w/ date  Drains? w/ date  Wound/skin?    GOC/CODE STATUS:  -DNR/DNI?    DISPO: SICU   ASSESSMENT:  29 yo w/ no significant PMH, currently  s/p spontaneous vaginal delivery at 39 wks following induction for severe pre-eclampsia/HELLP syndrome tonight (10/13). Pt w/ symptoms of N/V progressing to development of LUQ abd pain today (10/13), prompting her admission to L&D. Initial labs included thrombocytopenia, transaminitis, and anemia, raising concern for pre-ecclampsia/HELLP syndrome, for which the pt was ultimately induced. Following delivery pt remained hemodynamically stable, however repeat labs demonstrated worsening lab abnormalities, for which the SICU was consulted for hemodynamic monitoring.    PLAN:  NEURO:  -A&Ox4; denies HA/vision changes  -monitor mental status  -epidural in place (keeping in place in setting of thrombocytopenia)  -mag gtt - goal serum mag 4-7 mg/dL    RESPIRATORY:   -monitor respiratory status  -continuous pulse oxt  -monitor for pulmonary edema    CARDIOVASCULAR:  -monitor VS and perfusion status  -procardia 30 mg daily in setting of concern for pre-eclampsia    GI/NUTRITION:  -transaminitis; denies abd pain at this time  -CLD  -mylicon/mag hydroxide  -monitor for abd pain  -trend LFTs    GENITOURINARY/RENAL:  -s/p 2L IVF during delivery  -howard in place  -monitor UOP/Cr  -LR @ 75 cc/hr  -monitor electrolytes; q6 mag  -serum mag goal 4-7 mg/dL    HEMATOLOGIC:  -s/p 3u plt and 1u prbc  -hgb trending down 10.2>8.3>7.6;   -heme consulted; goal plt >20,000; goal hgb >7  -SCDs  -holding DVT ppx   -q6 cbc; trend PT/PTT/fibrinogen/LDH    INFECTIOUS DISEASE:  -monitor for fevers; trend WBC    ENDOCRINE:  -trend glucose    Lines:  PIV x 2  howard  epidural in place    GOC/CODE STATUS:  Full Code    DISPO: SICU     ASSESSMENT:  31 yo w/ no significant PMH, currently  s/p spontaneous vaginal delivery at 39 wks following induction for severe pre-eclampsia/HELLP syndrome tonight (10/13). Pt w/ symptoms of N/V progressing to development of LUQ abd pain today (10/13), prompting her admission to L&D. Initial labs included thrombocytopenia, transaminitis, and anemia, raising concern for pre-ecclampsia/HELLP syndrome, for which the pt was ultimately induced. Following delivery pt remained hemodynamically stable, however repeat labs demonstrated worsening lab abnormalities, for which the SICU was consulted for hemodynamic monitoring.    PLAN:  NEURO:  -A&Ox4; denies HA/vision changes  -monitor mental status  -epidural in place (keeping in place in setting of thrombocytopenia)  -mag gtt - goal serum mag 4-7 mg/dL    RESPIRATORY:   -monitor respiratory status  -continuous pulse oxt  -monitor for pulmonary edema    CARDIOVASCULAR:  -monitor VS and perfusion status  -procardia 30 mg daily in setting of concern for pre-eclampsia    GI/NUTRITION:  -transaminitis; denies abd pain at this time  -CLD  -mylicon/mag hydroxide  -monitor for abd pain  -trend LFTs    GENITOURINARY/RENAL:  -s/p 2L IVF during delivery  -howard in place  -monitor UOP/Cr  -LR @ 75 cc/hr  -monitor electrolytes; q6 mag  -serum mag goal 4-7 mg/dL    HEMATOLOGIC:  -s/p 3u plt and 1u prbc  -hgb trending down 10.2>8.3>7.6;   -heme consulted; goal plt >20,000; goal hgb >7  -SCDs  -holding DVT ppx   -q6 cbc; trend PT/PTT/fibrinogen/LDH    INFECTIOUS DISEASE:  -monitor for fevers; trend WBC    ENDOCRINE:  -trend glucose    Lines:  PIV x 2  howard  epidural in place    GOC/CODE STATUS:  Full Code    DISPO: SICU

## 2021-10-13 NOTE — CHART NOTE - NSCHARTNOTEFT_GEN_A_CORE
31yo F  39.4wks who presented with contractions. On admission found to have SBP 150s, Hb 11, Plt 70 (prev Plt 210 21) Tbili 0.7, AST//176, . Concern for pre-eclampsia vs HELLP, s/p vaginal delivery 2:30PM. EBL 1100ml. Hematology consulted for worsening HELLP labs    Rule out HELLP  - worsening HELLP labs  - Hb 11-> 7.6  - Plt 210 -> 34  - AST//176 -> 332/248  -  -> 790   - coags grossly wnl, fibrinogen 478; low suspicion for DIC          - in HELLP, transfuse for Hb <7, Plt <20  - send haptoglobin, retic  - peripheral smear    Discussed with on call attending *** 29yo F  39.4wks who presented with contractions. On admission found to have SBP 150s, Hb 11, Plt 70 (prev Plt 210 21) Tbili 0.7, AST//176, . Concern for pre-eclampsia vs HELLP, s/p vaginal delivery 2:30PM. EBL 1100ml. Hematology consulted for worsening anemia/thrombocytopenia    Anemia  Thrombocytopenia  - worsening HELLP labs:      - Hb 11-> 7.6      - Plt 210 -> 34      - AST//176 -> 332/248      -  -> 790   - normal bili  - coags grossly wnl, fibrinogen 478; low suspicion for DIC  - peripheral smear with 1-3 schistocytes, true thrombocytopenia  - does not appear to be TMA, more consistent with HELLP    - management of HELLP involves delivery which has already happened at 10/13 2:30pm  - in HELLP lab values may worsen initially 48h after delivery  - recommend trend CBC, LDH, LFTs q12h  - recommend transfuse for Hb <7, Plt <20  - send haptoglobin, retic    Discussed with on call attending Dr Fleming. 31yo F  39.4wks who presented with contractions. On admission found to have SBP 150s, Hb 11, Plt 70 (prev Plt 210 21) Tbili 0.7, AST//176, . Concern for pre-eclampsia vs HELLP, s/p vaginal delivery 2:30PM. EBL 1100ml. Hematology consulted for worsening anemia/thrombocytopenia    Anemia  Thrombocytopenia  - worsening HELLP labs:      - Hb 11-> 7.6      - Plt 210 -> 34      - AST//176 -> 332/248      -  -> 790   - normal bili  - coags grossly wnl, fibrinogen 478; low suspicion for DIC  - peripheral smear with 1-3 schistocytes, true thrombocytopenia  - does not appear to be TMA, more consistent with HELLP    - management of HELLP involves delivery which has already happened at 10/13 2:30pm  - in HELLP lab values may worsen initially 48h after delivery  - recommend trend CBC, LDH, LFTs q12h  - recommend transfuse for Hb <7, Plt <20  - send haptoglobin, retic    Discussed with on call attending Dr Fleming and SICU and Obstetrics team

## 2021-10-13 NOTE — OB PROVIDER DELIVERY SUMMARY - NSPROVIDERDELIVERYNOTE_OBGYN_ALL_OB_FT
Writer called and spoke with the patient. Patient stated that he has not had labs drawn since July or later of this year. Patient stated that he will go to [facility] lab to have them done, prior to visit on 10/29/2020.     Delivery of viable male infant, apgars 8,9, weight 3690g, cephalic presentation.   Grossly normal uterus, bilateral fallopian tubes and ovaries.   Uterine atony resolved with additional pitocin and IM hemabate x1.   QBL 1100ml, IVF 2000ml, UOP 150ml.

## 2021-10-13 NOTE — DISCHARGE NOTE OB - PATIENT PORTAL LINK FT
You can access the FollowMyHealth Patient Portal offered by [facility] by registering at the following website: [email or web address]. By joining [employer]’s FollowMyHealth portal, you will also be able to view your health information using other applications (apps) compatible with our system.

## 2021-10-13 NOTE — OB PROVIDER LABOR PROGRESS NOTE - NS_SUBJECTIVE/OBJECTIVE_OBGYN_ALL_OB_FT
Patient was admitted for labor induction due to preeclampsia.  Patient c/o epigastric pain  ICU Vital Signs:  T(C): 37 (13 Oct 2021 07:05), Max: 37.0 (13 Oct 2021 07:00)  T(F): 98.6 (13 Oct 2021 07:05), Max: 98.6 (13 Oct 2021 07:00)  HR: 78 (13 Oct 2021 08:02) (70 - 92)  BP: 133/70 (13 Oct 2021 07:55) (133/70 - 168/80)  RR: 18 (13 Oct 2021 07:05) (15 - 18)  SpO2: 100% (13 Oct 2021 07:52) (98% - 100%)  HELLP labs:                        11.0   10.43 )-----------( 70       ( 13 Oct 2021 05:08 )             34.3   10-13    136  |  103  |  14  ----------------------------<  85  4.8   |  14<L>  |  0.93    Ca    9.5      13 Oct 2021 05:08    TPro  6.6  /  Alb  3.8  /  TBili  0.7  /  DBili  x   /  AST  160<H>  /  ALT  176<H>  /  AlkPhos  150<H>  10-13

## 2021-10-13 NOTE — CHART NOTE - NSCHARTNOTEFT_GEN_A_CORE
PA Note    patient seen at bedside. Counseled about cervical balloon for IOL  patient prior nurse in L&D at [facility]  stated reasoning for expediting delivery given HELLP syndrome   patient expressed understanding but declined cervical balloon at this time    dw Dr Jermaine bella

## 2021-10-13 NOTE — OB NEONATOLOGY/PEDIATRICIAN DELIVERY SUMMARY - NSPEDSNEONOTESA_OBGYN_ALL_OB_FT
Baby is a 39.4 wk GA male born to a 31 y/o  mother via C/S. PEDS called to delivery for HELLP and mother on magnesium. Maternal history uncomplicated. Prenatal history notable for elevated liver enzymes and low platelets of 47k. Maternal blood type O+. PNL negative, non-reactive, and immune. GBS negative on . AROM at time of delivery, clear fluids. Baby born vigorous and crying spontaneously. Warmed, dried, stimulated. Apgars 8/9. EOS _____. Mom plans to breastfeed/bottlefeed and consents/declines hepB. Circ requested. Baby is a 39.4 wk GA male born to a 31 y/o  mother via C/S. PEDS called to delivery for HELLP and mother on magnesium. Maternal history uncomplicated. Prenatal history notable for elevated liver enzymes and low platelets of 47k. Maternal blood type O+. PNL negative, non-reactive, and immune. GBS negative on . AROM at time of delivery, clear fluids. Baby born vigorous and crying spontaneously. Warmed, dried, stimulated. Apgars 8/9. EOS 0.04, maternal Tmax 37. Mom plans to breastfeed and consents hepB. Circ requested.

## 2021-10-13 NOTE — OB PROVIDER TRIAGE NOTE - HISTORY OF PRESENT ILLNESS
31 yo  @ 39.4 wks complaining of contractions increasing since 8-9pm, unable to walk or sit, pt reports no relief of firmness of abdomen in between contractions. denies vb or lof, reports +GFM. AP course uncomplicated thus far. last seen by OB last week, last here in D&T Monday VE cervix closed. EFW 7#15.    GBS: negative  meds:PNV  All: denies  PMH: denies  PSH: tonsillectomy   gyn hx: denies  ob hx: denies 31 yo  @ 39.4 wks complaining of contractions increasing since 8-9pm, unable to walk or sit, pt reports no relief of firmness of abdomen in between contractions. denies vb or lof, reports +GFM. AP course uncomplicated thus far. denies fever chills ha new swelling vision changes cp sob or cough. last seen by OB last week, last here in D&T Monday VE cervix closed. EFW 7#15.    GBS: negative  meds: PNV  All: denies  PMH: denies  PSH: tonsillectomy   gyn hx: denies  ob hx: denies

## 2021-10-13 NOTE — DISCHARGE NOTE OB - CARE PROVIDER_API CALL
Zach Black  OBSTETRICS AND GYNECOLOGY  [address] [place] [address], [address]  [place] [address]  Phone: [phone]  Fax: [phone]  Follow Up Time:

## 2021-10-13 NOTE — OB PROVIDER TRIAGE NOTE - NSOBPROVIDERNOTE_OBGYN_ALL_OB_FT
d/w Dr Crawford- pt discomfort 7/10 but feels no relief in between contractions, nausea and vomiting since Monday not relieved by zofran since last evening, unable to keep anything down.  plan:  IV fluid bolus for unable to tolerate oral intake  Benadryl for discomfort  HELLP labs for labile /86, 149/87 29 yo  @ 39.4 wks complaining of contractions increasing since 8-9pm, unable to walk or sit, pt reports no relief of firmness of abdomen in between contractions. denies vb or lof, reports +GFM. AP course uncomplicated thus far. denies fever chills ha new swelling vision changes cp sob or cough. last seen by OB last week, last here in D&T Monday VE cervix closed. EFW 7#15.    GBS: negative  meds: PNV  All: denies  PMH: denies  PSH: tonsillectomy   gyn hx: denies  ob hx: denies    d/w Dr Crawford- pt discomfort 7/10 but feels no relief in between contractions, nausea and vomiting since Monday not relieved by zofran since last evening, unable to keep anything down.  plan:  IV fluid bolus for unable to tolerate oral intake  Benadryl for discomfort  HELLP labs for labile /86, 149/87    d/w Dr Frankel admit for labile BP, elevated LFTs n/v for Magnesium, procardia IOL @ 39.4wks  Magnesium bolus and continuous infusion for PEC  Procardia for PEC  for PO Cytotec for IOL  covid sent  prenatals reviewed  pain management as needed  repeat HELLP labs

## 2021-10-13 NOTE — OB RN DELIVERY SUMMARY - NS_SEPSISRSKCALC_OBGYN_ALL_OB_FT
EOS calculated successfully. EOS Risk Factor: 0.5/1000 live births ([facility] national incidence); GA=39w4d; Temp=98.6; ROM=0.017; GBS='Negative'; Antibiotics='No antibiotics or any antibiotics < 2 hrs prior to birth'

## 2021-10-13 NOTE — OB PROVIDER TRIAGE NOTE - NSHPLABSRESULTS_GEN_ALL_CORE
11.0   10.43 )-----------( x        ( 13 Oct 2021 05:08 )             34.3   10-13    136  |  103  |  14  ----------------------------<  85  4.8   |  14<L>  |  0.93    Ca    9.5      13 Oct 2021 05:08    TPro  6.6  /  Alb  3.8  /  TBili  0.7  /  DBili  x   /  AST  160<H>  /  ALT  176<H>  /  AlkPhos  150<H>  10-13  PT/INR - ( 13 Oct 2021 05:08 )   PT: 11.6 sec;   INR: 1.02 ratio         PTT - ( 13 Oct 2021 05:08 )  PTT:23.8 sec

## 2021-10-13 NOTE — DISCHARGE NOTE OB - CARE PLAN
1 Principal Discharge DX:	Single delivery by  section  Assessment and plan of treatment:	return to ofice 1 week  Secondary Diagnosis:	Hypertension in pregnancy, preeclampsia, severe, third trimester  Assessment and plan of treatment:	return to office 1 week

## 2021-10-13 NOTE — OB POSTPARTUM EVENT NOTE - NS_EVENTPTSUMMARY1_OBGYN_ALL_OB_FT
BP:  Hr 125  o2 sat 99  temp 37.1    Fundus firm @- mod bleeding  QBL 1100  OR fluids 2000 BP:117/104  Hr 125  o2 sat 99  temp 37.1    Fundus firm @- mod bleeding  QBL 1100  OR fluids 2000

## 2021-10-13 NOTE — CONSULT NOTE ADULT - SUBJECTIVE AND OBJECTIVE BOX
SICU CONSULT NOTE    HPI  31 yo w/ no significant PMH, currently  s/p spontaneous vaginal delivery at 39 wks following induction for severe pre-eclampsia/HELLP syndrome tonight (10/13). Pt w/ symptoms of N/V progressing to development of LUQ abd pain today, prompting her admission to L&D today. Initial labs included thrombocytopenia, transaminitis, and anemia, raising concern for pre-ecclampsia/HELLP syndrome, for which the pt was ultimately induced. Of note, pt was given 1 unit of platelets prior to delivery.    Intrapartum, pt had estimated blood loss of 1100 cc and was given 2 IL IVF. Delivery itself was otherwise uncomplicated with the exception of tachycardia to 120-130s, however post-partum lab work reflected worsening thrombocytopenia, transaminitis, and anemia. Platelets trended downward to 34 from 47, hgb trended from 10.2 to 7.6. Labs were otherwise significant for AST//266, and . Cr was 0.93 and had UOP of 285 cc since delivery. For concern for pre-eclampsia, pt was given procardia 30 mg x 1 and started on a magnesium infusion. Pt initially pending 2u prbc, 2u platelets, and 2u FFP per OB attending.    At time of initial examination for SICU consultation, pt resting comfortably in bed. VSS, w/ pt normotensive and HR 80s to 90s. 97% on RA. She denies N/V, abd pain, HA, vision changes, CP, and SOB. Given concern for further decompensation with worsening lab abnormalities, pt accepted to SICU for ongoing management.     PAST MEDICAL HISTORY: No pertinent past medical history    PAST SURGICAL HISTORY: S/P tonsillectomy    CODE STATUS:     HOME MEDICATIONS:  ALLERGIES: No Known Allergies    VITAL SIGNS:  ICU Vital Signs Last 24 Hrs  T(C): 37.1 (13 Oct 2021 15:15), Max: 37.1 (13 Oct 2021 15:15)  T(F): 98.8 (13 Oct 2021 15:15), Max: 98.8 (13 Oct 2021 15:15)  HR: 109 (13 Oct 2021 22:00) (70 - 137)  BP: 121/84 (13 Oct 2021 22:00) (59/41 - 168/80)  BP(mean): 90 (13 Oct 2021 22:00) (35 - 141)  ABP: --  ABP(mean): --  RR: 23 (13 Oct 2021 22:00) (15 - 26)  SpO2: 99% (13 Oct 2021 22:00) (98% - 100%)    NEURO  Exam:  acetaminophen   Tablet .. 975 milliGRAM(s) Oral <User Schedule>  diphenhydrAMINE 25 milliGRAM(s) Oral every 6 hours PRN Pruritus  magnesium sulfate Infusion 2 Gm/Hr IV Continuous <Continuous>  oxyCODONE    IR 5 milliGRAM(s) Oral once PRN Moderate to Severe Pain (4-10)  oxyCODONE    IR 5 milliGRAM(s) Oral every 3 hours PRN Moderate to Severe Pain (4-10)    RESPIRATORY    CARDIOVASCULAR    GI/NUTRITION  Exam:  Diet:  magnesium hydroxide Suspension 30 milliLiter(s) Oral two times a day PRN Constipation  simethicone 80 milliGRAM(s) Chew every 4 hours PRN Gas    GENITOURINARY/RENAL  lactated ringers. 1000 milliLiter(s) IV Continuous <Continuous>  oxytocin Infusion 16.667 milliUNIT(s)/Min IV Continuous <Continuous>    10-13 @ 07:  -  10-13 @ 22:19  --------------------------------------------------------  IN:    IV PiggyBack: 2000 mL    IV PiggyBack: 100 mL    Lactated Ringers: 300 mL    Lactated Ringers Bolus: 500 mL    Magnesium Sulfate: 300 mL    Magnesium Sulfate: 400 mL    Oxytocin: 300 mL  Total IN: 3900 mL    OUT:    Blood Loss (mL): 1100 mL    Indwelling Catheter - Urethral (mL): 2070 mL  Total OUT: 3170 mL    Total NET: 730 mL    Weight (kg): 64 (10-13 @ 07:05)  10-13    136  |  104  |  9   ----------------------------<  153<H>  4.1   |  17<L>  |  0.87    Ca    6.5<LL>      13 Oct 2021 20:03  Mg     6.60     10-13    TPro  5.1<L>  /  Alb  3.0<L>  /  TBili  0.8  /  DBili  x   /  AST  332<H>  /  ALT  248<H>  /  AlkPhos  118  10-13    [ ] Arreaga catheter, indication: urine output monitoring in critically ill patient    HEMATOLOGIC  [ ] VTE Prophylaxis:                        7.6    11.58 )-----------( 34       ( 13 Oct 2021 20:03 )             22.8     PT/INR - ( 13 Oct 2021 20:09 )   PT: 12.8 sec;   INR: 1.13 ratio         PTT - ( 13 Oct 2021 20:09 )  PTT:26.0 sec  Transfusion: [ ] PRBC	[ ] Platelets	[ ] FFP	[ ] Cryoprecipitate      INFECTIOUS DISEASES  diphtheria/tetanus/pertussis (acellular) Vaccine (ADAcel) 0.5 milliLiter(s) IntraMuscular once    RECENT CULTURES:    ENDOCRINE    CAPILLARY BLOOD GLUCOSE    PATIENT CARE ACCESS DEVICES:  [X] Peripheral IV x 2 18g  [ ] Central Venous Line	[ ] R	[ ] L	[ ] IJ	[ ] Fem	[ ] SC	Placed:   [ ] Arterial Line		[ ] R	[ ] L	[ ] Fem	[ ] Rad	[ ] Ax	Placed:   [ ] PICC:					[ ] Mediport  [ ] Urinary Catheter, Date Placed:   [x] Necessity of urinary, arterial, and venous catheters discussed    OTHER MEDICATIONS: lanolin Ointment 1 Application(s) Topical every 6 hours PRN    IMAGING STUDIES: SICU CONSULT NOTE    HPI  31 yo w/ no significant PMH, currently  s/p spontaneous vaginal delivery at 39 wks following induction for severe pre-eclampsia/HELLP syndrome tonight (10/13). Pt w/ symptoms of N/V progressing to development of LUQ abd pain today, prompting her admission to L&D today. Initial labs included thrombocytopenia, transaminitis, and anemia, raising concern for pre-ecclampsia/HELLP syndrome, for which the pt was ultimately induced. Of note, pt was given 1 unit of platelets prior to delivery.    Intrapartum, pt had estimated blood loss of 1100 cc and was given 2 IL IVF. Delivery itself was otherwise uncomplicated with the exception of tachycardia to 120-130s, however post-partum lab work reflected worsening thrombocytopenia, transaminitis, and anemia. Platelets trended downward to 34 from 47, hgb trended from 10.2 to 7.6. Labs were otherwise significant for AST//266, and . Cr was 0.93 and had UOP of 285 cc since delivery. For concern for pre-eclampsia, pt was given procardia 30 mg x 1 and started on a magnesium infusion. Pt initially pending 2u prbc, 2u platelets, and 2u FFP per OB attending.    At time of initial examination for SICU consultation, pt resting comfortably in bed. VSS, w/ pt normotensive and HR 80s to 90s. 97% on RA. She denies N/V, abd pain, HA, vision changes, CP, and SOB. Given concern for further decompensation with worsening lab abnormalities, pt accepted to SICU for ongoing management.     PAST MEDICAL HISTORY: No pertinent past medical history    PAST SURGICAL HISTORY: S/P tonsillectomy    HOME MEDICATIONS:  ALLERGIES: No Known Allergies    VITAL SIGNS:  ICU Vital Signs Last 24 Hrs  T(C): 37.1 (13 Oct 2021 15:15), Max: 37.1 (13 Oct 2021 15:15)  T(F): 98.8 (13 Oct 2021 15:15), Max: 98.8 (13 Oct 2021 15:15)  HR: 109 (13 Oct 2021 22:00) (70 - 137)  BP: 121/84 (13 Oct 2021 22:00) (59/41 - 168/80)  BP(mean): 90 (13 Oct 2021 22:00) (35 - 141)  ABP: --  ABP(mean): --  RR: 23 (13 Oct 2021 22:00) (15 - 26)  SpO2: 99% (13 Oct 2021 22:00) (98% - 100%)    Gen: A&Ox4, NAD  HEENT: Atraumatic. Mucous membranes moist, no scleral icterus. Pale conjunctiva  CV: RRR . No murmurs.  1+ bilat LE edema.  Distal pulses 2+. Capillary refill < 2 seconds.  Resp: Respirations unlabored. CTAB, no rales, rhonchi, or wheezes.  GI: Abdomen gravid, soft, mild tenderness over fundus, otherwise non tender to palpation. + BS.  Skin/MSK: No open wounds. No ecchymosis appreciated. No rashes.  Neuro: Following commands. Moving extremities spontaneously.  Psych: Appropriate mood, cooperative  NEURO  Exam:  acetaminophen   Tablet .. 975 milliGRAM(s) Oral <User Schedule>  diphenhydrAMINE 25 milliGRAM(s) Oral every 6 hours PRN Pruritus  magnesium sulfate Infusion 2 Gm/Hr IV Continuous <Continuous>  oxyCODONE    IR 5 milliGRAM(s) Oral once PRN Moderate to Severe Pain (4-10)  oxyCODONE    IR 5 milliGRAM(s) Oral every 3 hours PRN Moderate to Severe Pain (4-10)    RESPIRATORY    CARDIOVASCULAR    GI/NUTRITION  Exam:  Diet:  magnesium hydroxide Suspension 30 milliLiter(s) Oral two times a day PRN Constipation  simethicone 80 milliGRAM(s) Chew every 4 hours PRN Gas    GENITOURINARY/RENAL  lactated ringers. 1000 milliLiter(s) IV Continuous <Continuous>  oxytocin Infusion 16.667 milliUNIT(s)/Min IV Continuous <Continuous>    10-13 @ 07:  -  10-13 @ 22:19  --------------------------------------------------------  IN:    IV PiggyBack: 2000 mL    IV PiggyBack: 100 mL    Lactated Ringers: 300 mL    Lactated Ringers Bolus: 500 mL    Magnesium Sulfate: 300 mL    Magnesium Sulfate: 400 mL    Oxytocin: 300 mL  Total IN: 3900 mL    OUT:    Blood Loss (mL): 1100 mL    Indwelling Catheter - Urethral (mL): 2070 mL  Total OUT: 3170 mL    Total NET: 730 mL    Weight (kg): 64 (10-13 @ 07:05)  10-13    136  |  104  |  9   ----------------------------<  153<H>  4.1   |  17<L>  |  0.87    Ca    6.5<LL>      13 Oct 2021 20:03  Mg     6.60     10-13    TPro  5.1<L>  /  Alb  3.0<L>  /  TBili  0.8  /  DBili  x   /  AST  332<H>  /  ALT  248<H>  /  AlkPhos  118  10-13    [ ] Arreaga catheter, indication: urine output monitoring in critically ill patient    HEMATOLOGIC  [ ] VTE Prophylaxis:                        7.6    11.58 )-----------( 34       ( 13 Oct 2021 20:03 )             22.8     PT/INR - ( 13 Oct 2021 20:09 )   PT: 12.8 sec;   INR: 1.13 ratio         PTT - ( 13 Oct 2021 20:09 )  PTT:26.0 sec  Transfusion: [ ] PRBC	[ ] Platelets	[ ] FFP	[ ] Cryoprecipitate      INFECTIOUS DISEASES  diphtheria/tetanus/pertussis (acellular) Vaccine (ADAcel) 0.5 milliLiter(s) IntraMuscular once    RECENT CULTURES:    ENDOCRINE    CAPILLARY BLOOD GLUCOSE    PATIENT CARE ACCESS DEVICES:  [X] Peripheral IV x 2 18g  [ ] Central Venous Line	[ ] R	[ ] L	[ ] IJ	[ ] Fem	[ ] SC	Placed:   [ ] Arterial Line		[ ] R	[ ] L	[ ] Fem	[ ] Rad	[ ] Ax	Placed:   [ ] PICC:					[ ] Mediport  [ ] Urinary Catheter, Date Placed:   [x] Necessity of urinary, arterial, and venous catheters discussed    OTHER MEDICATIONS: lanolin Ointment 1 Application(s) Topical every 6 hours PRN    IMAGING STUDIES: SICU CONSULT NOTE    HPI  31 yo w/ no significant PMH, currently  s/p spontaneous vaginal delivery at 39 wks following induction for severe pre-eclampsia/HELLP syndrome tonight (10/13). Pt w/ symptoms of N/V progressing to development of LUQ abd pain today, prompting her admission to L&D today. Initial labs included thrombocytopenia, transaminitis, and anemia, raising concern for pre-ecclampsia/HELLP syndrome, for which the pt was ultimately induced. Of note, pt was given 1 unit of platelets prior to delivery.    Intrapartum, pt had estimated blood loss of 1100 cc and was given 2 IL IVF. Delivery itself was otherwise uncomplicated with the exception of tachycardia to 120-130s, however post-partum lab work reflected worsening thrombocytopenia, transaminitis, and anemia. Platelets trended downward to 34 from 47, hgb trended from 10.2 to 7.6. Labs were otherwise significant for AST//266, and . Cr was 0.93 and had UOP of 285 cc since delivery. For concern for pre-eclampsia, pt was given procardia 30 mg x 1 and started on a magnesium infusion. Pt initially pending 2u prbc, 2u platelets, and 2u FFP per OB attending.    At time of initial examination for SICU consultation, pt resting comfortably in bed. VSS, w/ pt normotensive and HR 80s to 90s. 97% on RA. She denies N/V, abd pain, HA, vision changes, CP, and SOB. Given concern for further decompensation with worsening lab abnormalities, pt accepted to SICU for ongoing management.     PAST MEDICAL HISTORY: No pertinent past medical history    PAST SURGICAL HISTORY: S/P tonsillectomy    HOME MEDICATIONS:  ALLERGIES: No Known Allergies    VITAL SIGNS:  ICU Vital Signs Last 24 Hrs  T(C): 37.1 (13 Oct 2021 15:15), Max: 37.1 (13 Oct 2021 15:15)  T(F): 98.8 (13 Oct 2021 15:15), Max: 98.8 (13 Oct 2021 15:15)  HR: 109 (13 Oct 2021 22:00) (70 - 137)  BP: 121/84 (13 Oct 2021 22:00) (59/41 - 168/80)  BP(mean): 90 (13 Oct 2021 22:00) (35 - 141)  ABP: --  ABP(mean): --  RR: 23 (13 Oct 2021 22:00) (15 - 26)  SpO2: 99% (13 Oct 2021 22:00) (98% - 100%)    Gen: A&Ox4, NAD  HEENT: Atraumatic. Mucous membranes moist, no scleral icterus. Pale conjunctiva  CV: RRR . No murmurs.  1+ bilat LE edema.  Distal pulses 2+. Capillary refill < 2 seconds.  Resp: Respirations unlabored. CTAB, no rales, rhonchi, or wheezes.  GI: Abdomen gravid, soft, mild tenderness over fundus, otherwise non tender to palpation. + BS.  Skin/MSK: No open wounds. No ecchymosis appreciated. No rashes.  Neuro: Following commands. Moving extremities spontaneously.  Psych: Appropriate mood, cooperative  NEURO  Exam:  acetaminophen   Tablet .. 975 milliGRAM(s) Oral <User Schedule>  diphenhydrAMINE 25 milliGRAM(s) Oral every 6 hours PRN Pruritus  magnesium sulfate Infusion 2 Gm/Hr IV Continuous <Continuous>  oxyCODONE    IR 5 milliGRAM(s) Oral once PRN Moderate to Severe Pain (4-10)  oxyCODONE    IR 5 milliGRAM(s) Oral every 3 hours PRN Moderate to Severe Pain (4-10)    RESPIRATORY    CARDIOVASCULAR    GI/NUTRITION  Exam:  Diet:  magnesium hydroxide Suspension 30 milliLiter(s) Oral two times a day PRN Constipation  simethicone 80 milliGRAM(s) Chew every 4 hours PRN Gas    GENITOURINARY/RENAL  lactated ringers. 1000 milliLiter(s) IV Continuous <Continuous>  oxytocin Infusion 16.667 milliUNIT(s)/Min IV Continuous <Continuous>    10-13 @ 07:  -  10-13 @ 22:19  --------------------------------------------------------  IN:    IV PiggyBack: 2000 mL    IV PiggyBack: 100 mL    Lactated Ringers: 300 mL    Lactated Ringers Bolus: 500 mL    Magnesium Sulfate: 300 mL    Magnesium Sulfate: 400 mL    Oxytocin: 300 mL  Total IN: 3900 mL    OUT:    Blood Loss (mL): 1100 mL    Indwelling Catheter - Urethral (mL): 2070 mL  Total OUT: 3170 mL    Total NET: 730 mL    Weight (kg): 64 (10-13 @ 07:05)  10-13    136  |  104  |  9   ----------------------------<  153<H>  4.1   |  17<L>  |  0.87    Ca    6.5<LL>      13 Oct 2021 20:03  Mg     6.60     10-13    TPro  5.1<L>  /  Alb  3.0<L>  /  TBili  0.8  /  DBili  x   /  AST  332<H>  /  ALT  248<H>  /  AlkPhos  118  10-13    [ ] Arreaga catheter, indication: urine output monitoring in critically ill patient    HEMATOLOGIC  [ ] VTE Prophylaxis:                        7.6    11.58 )-----------( 34       ( 13 Oct 2021 20:03 )             22.8     PT/INR - ( 13 Oct 2021 20:09 )   PT: 12.8 sec;   INR: 1.13 ratio         PTT - ( 13 Oct 2021 20:09 )  PTT:26.0 sec  Transfusion: [ ] PRBC	[ ] Platelets	[ ] FFP	[ ] Cryoprecipitate      INFECTIOUS DISEASES  diphtheria/tetanus/pertussis (acellular) Vaccine (ADAcel) 0.5 milliLiter(s) IntraMuscular once    RECENT CULTURES:    ENDOCRINE    CAPILLARY BLOOD GLUCOSE    PATIENT CARE ACCESS DEVICES:  [X] Peripheral IV x 2 18g  [ ] Central Venous Line	[ ] R	[ ] L	[ ] IJ	[ ] Fem	[ ] SC	Placed:   [ ] Arterial Line		[ ] R	[ ] L	[ ] Fem	[ ] Rad	[ ] Ax	Placed:   [ ] PICC:					[ ] Mediport  [ ] Urinary Catheter, Date Placed:   [x] Necessity of urinary, arterial, and venous catheters discussed    OTHER MEDICATIONS: lanolin Ointment 1 Application(s) Topical every 6 hours PRN    IMAGING STUDIES:    Gen: A&Ox4, NAD  HEENT: Atraumatic. Mucous membranes moist, no scleral icterus. Pale conjunctiva  CV: RRR . No murmurs.  1+ bilat LE edema.  Distal pulses 2+. Capillary refill < 2 seconds.  Resp: Respirations unlabored. CTAB, no rales, rhonchi, or wheezes.  GI: Abdomen gravid, soft, mild tenderness over fundus, otherwise non tender to palpation. + BS.  Skin/MSK: No open wounds. No ecchymosis appreciated. No rashes.  Neuro: Following commands. Moving extremities spontaneously.  Psych: Appropriate mood, cooperative

## 2021-10-13 NOTE — OB PROVIDER H&P - ASSESSMENT
29 yo  @ 39.4 wks complaining of contractions increasing since 8-9pm, unable to walk or sit, pt reports no relief of firmness of abdomen in between contractions. denies vb or lof, reports +GFM. AP course uncomplicated thus far. denies fever chills ha new swelling vision changes cp sob or cough. last seen by OB last week, last here in D&T Monday VE cervix closed. EFW 7#15.    GBS: negative  meds: PNV  All: denies  PMH: denies  PSH: tonsillectomy   gyn hx: denies  ob hx: denies    d/w Dr Crawford- pt discomfort 7/10 but feels no relief in between contractions, nausea and vomiting since Monday not relieved by zofran since last evening, unable to keep anything down.  plan:  IV fluid bolus for unable to tolerate oral intake  Benadryl for discomfort  HELLP labs for labile /86, 149/87    d/w Dr Frankel/Dr Crawford admit for labile BP, elevated LFTs n/v for Magnesium, procardia IOL @ 39.4wks  Magnesium bolus and continuous infusion for PEC  Procardia for PEC  for PO Cytotec for IOL  covid sent  prenatals reviewed  pain management as needed  repeat HELLP labs

## 2021-10-13 NOTE — OB RN INTRAOPERATIVE NOTE - NS_ELECTROSURGICALUNITBIOMEDNUMBER_OBGYN_ALL_OB_FT
Patient called back to check status of prescription. Patient states she is completely out of this medication.   [id number]

## 2021-10-13 NOTE — CHART NOTE - NSCHARTNOTEFT_GEN_A_CORE
PO 39.4 admitted with SPEC and elevated  LFT and plts of 70. Anesthesia saw patient and placed epidural  induction begun  repeat plts 47, patient c/o abd pain, nausea and vomiting.   Plan C/S, 1 unit of plts  MERRYM consulted, huddle with nursing and anestrhesia who agree with plan

## 2021-10-13 NOTE — OB PROVIDER H&P - HISTORY OF PRESENT ILLNESS
29 yo  @ 39.4 wks complaining of contractions increasing since 8-9pm, unable to walk or sit, pt reports no relief of firmness of abdomen in between contractions. denies vb or lof, reports +GFM. AP course uncomplicated thus far. denies fever chills ha new swelling vision changes cp sob or cough. last seen by OB last week, last here in D&T Monday VE cervix closed. EFW 7#15.    GBS: negative  meds: PNV  All: denies  PMH: denies  PSH: tonsillectomy   gyn hx: denies  ob hx: denies

## 2021-10-13 NOTE — OB RN INTRAOPERATIVE NOTE - NSSELHIDDEN_OBGYN_ALL_OB_FT
[NS_DeliveryAttending1_OBGYN_ALL_OB_FT:[id number]],[NS_DeliveryAssist1_OBGYN_ALL_OB_FT:[id number]],[NS_DeliveryRN_OBGYN_ALL_OB_FT:[id number]]

## 2021-10-13 NOTE — PROGRESS NOTE ADULT - SUBJECTIVE AND OBJECTIVE BOX
patient with HELLP syndrome - PLT 47 now decreased PLT 34 - s/p 1 unit PLT; hemoglobin 7.6; LFTs - ALT/AST - 248/332; urine output 100-150/hr; vitals stable.  patient is asymptomatic.  patient denies chest pain, shortness of breath, lightheadedness, dizziness.  Discussed with patient about current situation - SICU consult pending.  Explained that patient will need more blood transfusion and further observation which would be best in an ICU setting.  patient verbalized understanding and agreed.

## 2021-10-13 NOTE — PROGRESS NOTE ADULT - ASSESSMENT
A/P: 31yo P1 at 39w4d who is currently undergoing an IOL for severe preeclampsia/HELLP. Vitals in mild range. Pt more uncomfortable w/ worsening nausea and epigastric pain. Hyperreflexic on exam. Fetal status reassuring. Labs downtrending w/ worseniing LFTs, downtrending plts Hct dec. Given how remote from delivery she is we explained that she will likely develop worsening symptoms, labs, and possible fetal decomp prior to her vaginal delivery. For this risk of  outweighs the likely 24-48hrs remaining in this induction at which time her disease will progress. Pt and  in agreement w/ plan. We discussed this reccomendation w/ Dr. Thurston. We also had a huddle on L&D involvement Dr. Morrow. Dr. Clark. Dr. Quinn Dr. Paleschi Charge nurse, pts nurse, Dr. Lowry. We are additionally advising for q6hr labs post partum, 1u plts prior to . Blood and additional plts on standby. Two large bore IVs. Prophalyatic TXA. mg gtt 24hr pp. Please reach out w/ any further questions/concerns.      Patient seen with Dr. Clark (M attending)    Roldan Rendon M.D. PGY-5  Maternal Fetal Medicine Fellow

## 2021-10-13 NOTE — OB PROVIDER H&P - NSHPPHYSICALEXAM_GEN_ALL_CORE
abd soft gravid NT  LS clear bilaterally  CV RRR  TAS: vertex  posterior placenta  BPP 8/8  RUTH:12.6  SVE: 0.5/50/-3  FHT: moderate variability, + accels, negative decels  toco: irreg q 4-6 minutes  Vital Signs Last 24 Hrs  T(C): 36.5 (13 Oct 2021 03:49), Max: 36.5 (13 Oct 2021 03:49)  T(F): 97.7 (13 Oct 2021 03:49), Max: 97.7 (13 Oct 2021 03:49)  HR: 76 (13 Oct 2021 04:26) (74 - 76)  BP: 149/87 (13 Oct 2021 04:11) (149/87 - 157/86)  BP(mean): --  RR: 15 (13 Oct 2021 03:49) (15 - 15)  SpO2: --

## 2021-10-13 NOTE — OB RN DELIVERY SUMMARY - NS_LABORANALGESIA_OBGYN_ALL_OB
"  Adult Mental Health Outpatient Group Therapy Progress Note     Client Initial Individualized Goals for Treatment: Cristina reports the reason for referral at this time is shaina. \"I want be enrolled back in college and taking classes and be on top of my game\". \"I want to get a therapist, return to work and college, get a membership to a gym and a swimming pool\"     See Initial Treatment suggestions for the client during the time between Diagnostic Assessment and completion of the Master Individualized Treatment Plan.     Treatment Goals:     See above    Area of Treatment Focus:  Symptom Management, Develop / Improve Independent Living Skills and Develop Socialization / Interpersonal Relationship Skills    Therapeutic Interventions/Treatment Strategies:  Support, Feedback, Safety Assessments, Structured Activity and Problem Solving    Response to Treatment Strategies:  Accepted Feedback, Gave Feedback, Listened, Focused on Goals, Attentive and Accepted Support    Name of Group:  Life Skills: OT Clinic     Description and Outcome:  Pt attended and participated in a structured occupational therapy group where intervention focused on coping through structured hands-on activities to improve function in valued roles, routines, and independent living skills. Client had an elevated level of energy in session. She was hyperverbal and at times tangential. She needed ongoing verbal cues to focus on structured individual activity. Poor task focus. Client would benefit from additional opportunities to practice and implement content from this session. Client addressed ITP goal number initial goal in this session.    Is this a Weekly Review of the Progress on the Treatment Plan?  No          " Analgesia was given

## 2021-10-13 NOTE — DISCHARGE NOTE OB - MEDICATION SUMMARY - MEDICATIONS TO TAKE
I will START or STAY ON the medications listed below when I get home from the hospital:    acetaminophen 325 mg oral tablet  -- 3 tab(s) by mouth   -- Indication: For Pain    Prenatal Multivitamins oral tablet (obsolete)  -- Indication: For vitamins

## 2021-10-14 LAB
ALBUMIN SERPL ELPH-MCNC: 3.1 G/DL — LOW (ref 3.3–5)
ALBUMIN SERPL ELPH-MCNC: 3.4 G/DL — SIGNIFICANT CHANGE UP (ref 3.3–5)
ALBUMIN SERPL ELPH-MCNC: 3.6 G/DL — SIGNIFICANT CHANGE UP (ref 3.3–5)
ALP SERPL-CCNC: 110 U/L — SIGNIFICANT CHANGE UP (ref 40–120)
ALP SERPL-CCNC: 118 U/L — SIGNIFICANT CHANGE UP (ref 40–120)
ALP SERPL-CCNC: 124 U/L — HIGH (ref 40–120)
ALT FLD-CCNC: 200 U/L — HIGH (ref 4–33)
ALT FLD-CCNC: 202 U/L — HIGH (ref 4–33)
ALT FLD-CCNC: 207 U/L — HIGH (ref 4–33)
ANION GAP SERPL CALC-SCNC: 11 MMOL/L — SIGNIFICANT CHANGE UP (ref 7–14)
ANION GAP SERPL CALC-SCNC: 7 MMOL/L — SIGNIFICANT CHANGE UP (ref 7–14)
ANION GAP SERPL CALC-SCNC: 8 MMOL/L — SIGNIFICANT CHANGE UP (ref 7–14)
APTT BLD: 22.3 SEC — LOW (ref 27–36.3)
APTT BLD: 23.5 SEC — LOW (ref 27–36.3)
APTT BLD: 25.1 SEC — LOW (ref 27–36.3)
AST SERPL-CCNC: 171 U/L — HIGH (ref 4–32)
AST SERPL-CCNC: 212 U/L — HIGH (ref 4–32)
AST SERPL-CCNC: 250 U/L — HIGH (ref 4–32)
BASOPHILS # BLD AUTO: 0.01 K/UL — SIGNIFICANT CHANGE UP (ref 0–0.2)
BASOPHILS NFR BLD AUTO: 0.1 % — SIGNIFICANT CHANGE UP (ref 0–2)
BILIRUB SERPL-MCNC: 0.7 MG/DL — SIGNIFICANT CHANGE UP (ref 0.2–1.2)
BILIRUB SERPL-MCNC: 0.8 MG/DL — SIGNIFICANT CHANGE UP (ref 0.2–1.2)
BILIRUB SERPL-MCNC: 0.8 MG/DL — SIGNIFICANT CHANGE UP (ref 0.2–1.2)
BUN SERPL-MCNC: 7 MG/DL — SIGNIFICANT CHANGE UP (ref 7–23)
BUN SERPL-MCNC: 8 MG/DL — SIGNIFICANT CHANGE UP (ref 7–23)
BUN SERPL-MCNC: 8 MG/DL — SIGNIFICANT CHANGE UP (ref 7–23)
CA-I BLD-SCNC: 0.81 MMOL/L — LOW (ref 1.15–1.29)
CA-I BLD-SCNC: 0.94 MMOL/L — LOW (ref 1.15–1.29)
CALCIUM SERPL-MCNC: 6.1 MG/DL — CRITICAL LOW (ref 8.4–10.5)
CALCIUM SERPL-MCNC: 7 MG/DL — LOW (ref 8.4–10.5)
CALCIUM SERPL-MCNC: 7 MG/DL — LOW (ref 8.4–10.5)
CHLORIDE SERPL-SCNC: 101 MMOL/L — SIGNIFICANT CHANGE UP (ref 98–107)
CHLORIDE SERPL-SCNC: 102 MMOL/L — SIGNIFICANT CHANGE UP (ref 98–107)
CHLORIDE SERPL-SCNC: 106 MMOL/L — SIGNIFICANT CHANGE UP (ref 98–107)
CO2 SERPL-SCNC: 22 MMOL/L — SIGNIFICANT CHANGE UP (ref 22–31)
CO2 SERPL-SCNC: 27 MMOL/L — SIGNIFICANT CHANGE UP (ref 22–31)
CO2 SERPL-SCNC: 27 MMOL/L — SIGNIFICANT CHANGE UP (ref 22–31)
COVID-19 SPIKE DOMAIN AB INTERP: POSITIVE
COVID-19 SPIKE DOMAIN ANTIBODY RESULT: >250 U/ML — HIGH
CREAT SERPL-MCNC: 0.67 MG/DL — SIGNIFICANT CHANGE UP (ref 0.5–1.3)
CREAT SERPL-MCNC: 0.77 MG/DL — SIGNIFICANT CHANGE UP (ref 0.5–1.3)
CREAT SERPL-MCNC: 0.81 MG/DL — SIGNIFICANT CHANGE UP (ref 0.5–1.3)
EOSINOPHIL # BLD AUTO: 0 K/UL — SIGNIFICANT CHANGE UP (ref 0–0.5)
EOSINOPHIL NFR BLD AUTO: 0 % — SIGNIFICANT CHANGE UP (ref 0–6)
FIBRINOGEN PPP-MCNC: 421 MG/DL — SIGNIFICANT CHANGE UP (ref 290–520)
FIBRINOGEN PPP-MCNC: 517 MG/DL — SIGNIFICANT CHANGE UP (ref 290–520)
FIBRINOGEN PPP-MCNC: 581 MG/DL — HIGH (ref 290–520)
GLUCOSE SERPL-MCNC: 128 MG/DL — HIGH (ref 70–99)
GLUCOSE SERPL-MCNC: 181 MG/DL — HIGH (ref 70–99)
GLUCOSE SERPL-MCNC: 93 MG/DL — SIGNIFICANT CHANGE UP (ref 70–99)
HAPTOGLOB SERPL-MCNC: < 20 MG/DL — SIGNIFICANT CHANGE UP (ref 34–200)
HCT VFR BLD CALC: 24 % — LOW (ref 34.5–45)
HCT VFR BLD CALC: 26.6 % — LOW (ref 34.5–45)
HCT VFR BLD CALC: 26.8 % — LOW (ref 34.5–45)
HGB BLD-MCNC: 8.2 G/DL — LOW (ref 11.5–15.5)
HGB BLD-MCNC: 9.2 G/DL — LOW (ref 11.5–15.5)
HGB BLD-MCNC: 9.4 G/DL — LOW (ref 11.5–15.5)
IANC: 10.93 K/UL — HIGH (ref 1.5–8.5)
IMM GRANULOCYTES NFR BLD AUTO: 1.3 % — SIGNIFICANT CHANGE UP (ref 0–1.5)
INR BLD: 1.02 RATIO — SIGNIFICANT CHANGE UP (ref 0.88–1.16)
INR BLD: 1.04 RATIO — SIGNIFICANT CHANGE UP (ref 0.88–1.16)
INR BLD: 1.08 RATIO — SIGNIFICANT CHANGE UP (ref 0.88–1.16)
LDH SERPL L TO P-CCNC: 736 U/L — HIGH (ref 135–225)
LDH SERPL L TO P-CCNC: 779 U/L — HIGH (ref 135–225)
LDH SERPL L TO P-CCNC: 793 U/L — HIGH (ref 135–225)
LYMPHOCYTES # BLD AUTO: 0.68 K/UL — LOW (ref 1–3.3)
LYMPHOCYTES # BLD AUTO: 5.4 % — LOW (ref 13–44)
MAGNESIUM SERPL-MCNC: 5.3 MG/DL — HIGH (ref 1.6–2.6)
MAGNESIUM SERPL-MCNC: 5.5 MG/DL — HIGH (ref 1.6–2.6)
MAGNESIUM SERPL-MCNC: 6.8 MG/DL — HIGH (ref 1.6–2.6)
MCHC RBC-ENTMCNC: 29.1 PG — SIGNIFICANT CHANGE UP (ref 27–34)
MCHC RBC-ENTMCNC: 29.3 PG — SIGNIFICANT CHANGE UP (ref 27–34)
MCHC RBC-ENTMCNC: 29.4 PG — SIGNIFICANT CHANGE UP (ref 27–34)
MCHC RBC-ENTMCNC: 34.2 GM/DL — SIGNIFICANT CHANGE UP (ref 32–36)
MCHC RBC-ENTMCNC: 34.6 GM/DL — SIGNIFICANT CHANGE UP (ref 32–36)
MCHC RBC-ENTMCNC: 35.1 GM/DL — SIGNIFICANT CHANGE UP (ref 32–36)
MCV RBC AUTO: 83.5 FL — SIGNIFICANT CHANGE UP (ref 80–100)
MCV RBC AUTO: 84.2 FL — SIGNIFICANT CHANGE UP (ref 80–100)
MCV RBC AUTO: 86 FL — SIGNIFICANT CHANGE UP (ref 80–100)
MONOCYTES # BLD AUTO: 0.83 K/UL — SIGNIFICANT CHANGE UP (ref 0–0.9)
MONOCYTES NFR BLD AUTO: 6.6 % — SIGNIFICANT CHANGE UP (ref 2–14)
NEUTROPHILS # BLD AUTO: 10.93 K/UL — HIGH (ref 1.8–7.4)
NEUTROPHILS NFR BLD AUTO: 86.6 % — HIGH (ref 43–77)
NRBC # BLD: 0 /100 WBCS — SIGNIFICANT CHANGE UP
NRBC # FLD: 0.02 K/UL — HIGH
PHOSPHATE SERPL-MCNC: 2.2 MG/DL — LOW (ref 2.5–4.5)
PHOSPHATE SERPL-MCNC: 3.2 MG/DL — SIGNIFICANT CHANGE UP (ref 2.5–4.5)
PLATELET # BLD AUTO: 101 K/UL — LOW (ref 150–400)
PLATELET # BLD AUTO: 79 K/UL — LOW (ref 150–400)
PLATELET # BLD AUTO: 88 K/UL — LOW (ref 150–400)
POTASSIUM SERPL-MCNC: 3.6 MMOL/L — SIGNIFICANT CHANGE UP (ref 3.5–5.3)
POTASSIUM SERPL-MCNC: 3.9 MMOL/L — SIGNIFICANT CHANGE UP (ref 3.5–5.3)
POTASSIUM SERPL-MCNC: 4.6 MMOL/L — SIGNIFICANT CHANGE UP (ref 3.5–5.3)
POTASSIUM SERPL-SCNC: 3.6 MMOL/L — SIGNIFICANT CHANGE UP (ref 3.5–5.3)
POTASSIUM SERPL-SCNC: 3.9 MMOL/L — SIGNIFICANT CHANGE UP (ref 3.5–5.3)
POTASSIUM SERPL-SCNC: 4.6 MMOL/L — SIGNIFICANT CHANGE UP (ref 3.5–5.3)
PROT SERPL-MCNC: 5 G/DL — LOW (ref 6–8.3)
PROT SERPL-MCNC: 5.7 G/DL — LOW (ref 6–8.3)
PROT SERPL-MCNC: 6 G/DL — SIGNIFICANT CHANGE UP (ref 6–8.3)
PROTHROM AB SERPL-ACNC: 11.6 SEC — SIGNIFICANT CHANGE UP (ref 10.6–13.6)
PROTHROM AB SERPL-ACNC: 11.9 SEC — SIGNIFICANT CHANGE UP (ref 10.6–13.6)
PROTHROM AB SERPL-ACNC: 12.4 SEC — SIGNIFICANT CHANGE UP (ref 10.6–13.6)
RBC # BLD: 2.79 M/UL — LOW (ref 3.8–5.2)
RBC # BLD: 2.79 M/UL — LOW (ref 3.8–5.2)
RBC # BLD: 3.16 M/UL — LOW (ref 3.8–5.2)
RBC # BLD: 3.21 M/UL — LOW (ref 3.8–5.2)
RBC # FLD: 14.3 % — SIGNIFICANT CHANGE UP (ref 10.3–14.5)
RBC # FLD: 14.4 % — SIGNIFICANT CHANGE UP (ref 10.3–14.5)
RBC # FLD: 14.9 % — HIGH (ref 10.3–14.5)
RETICS #: 61.9 K/UL — SIGNIFICANT CHANGE UP (ref 25–125)
RETICS/RBC NFR: 2.2 % — SIGNIFICANT CHANGE UP (ref 0.5–2.5)
SARS-COV-2 IGG+IGM SERPL QL IA: >250 U/ML — HIGH
SARS-COV-2 IGG+IGM SERPL QL IA: POSITIVE
SODIUM SERPL-SCNC: 134 MMOL/L — LOW (ref 135–145)
SODIUM SERPL-SCNC: 137 MMOL/L — SIGNIFICANT CHANGE UP (ref 135–145)
SODIUM SERPL-SCNC: 140 MMOL/L — SIGNIFICANT CHANGE UP (ref 135–145)
WBC # BLD: 12.61 K/UL — HIGH (ref 3.8–10.5)
WBC # BLD: 14.31 K/UL — HIGH (ref 3.8–10.5)
WBC # BLD: 15.09 K/UL — HIGH (ref 3.8–10.5)
WBC # FLD AUTO: 12.61 K/UL — HIGH (ref 3.8–10.5)
WBC # FLD AUTO: 14.31 K/UL — HIGH (ref 3.8–10.5)
WBC # FLD AUTO: 15.09 K/UL — HIGH (ref 3.8–10.5)

## 2021-10-14 PROCEDURE — 76604 US EXAM CHEST: CPT | Mod: 26

## 2021-10-14 PROCEDURE — 99232 SBSQ HOSP IP/OBS MODERATE 35: CPT | Mod: GC

## 2021-10-14 PROCEDURE — 99223 1ST HOSP IP/OBS HIGH 75: CPT

## 2021-10-14 PROCEDURE — 99291 CRITICAL CARE FIRST HOUR: CPT

## 2021-10-14 RX ORDER — CALCIUM GLUCONATE 100 MG/ML
1 VIAL (ML) INTRAVENOUS ONCE
Refills: 0 | Status: COMPLETED | OUTPATIENT
Start: 2021-10-14 | End: 2021-10-14

## 2021-10-14 RX ORDER — MAGNESIUM SULFATE 500 MG/ML
1 VIAL (ML) INJECTION
Qty: 40 | Refills: 0 | Status: DISCONTINUED | OUTPATIENT
Start: 2021-10-14 | End: 2021-10-14

## 2021-10-14 RX ORDER — OXYCODONE HYDROCHLORIDE 5 MG/1
5 TABLET ORAL ONCE
Refills: 0 | Status: DISCONTINUED | OUTPATIENT
Start: 2021-10-14 | End: 2021-10-14

## 2021-10-14 RX ORDER — ACETAMINOPHEN 500 MG
975 TABLET ORAL ONCE
Refills: 0 | Status: COMPLETED | OUTPATIENT
Start: 2021-10-14 | End: 2021-10-14

## 2021-10-14 RX ORDER — OXYCODONE HYDROCHLORIDE 5 MG/1
5 TABLET ORAL
Refills: 0 | Status: DISCONTINUED | OUTPATIENT
Start: 2021-10-14 | End: 2021-10-15

## 2021-10-14 RX ORDER — HEPARIN SODIUM 5000 [USP'U]/ML
5000 INJECTION INTRAVENOUS; SUBCUTANEOUS EVERY 8 HOURS
Refills: 0 | Status: DISCONTINUED | OUTPATIENT
Start: 2021-10-14 | End: 2021-10-15

## 2021-10-14 RX ADMIN — SODIUM CHLORIDE 75 MILLILITER(S): 9 INJECTION, SOLUTION INTRAVENOUS at 01:33

## 2021-10-14 RX ADMIN — OXYCODONE HYDROCHLORIDE 5 MILLIGRAM(S): 5 TABLET ORAL at 22:35

## 2021-10-14 RX ADMIN — Medication 975 MILLIGRAM(S): at 09:18

## 2021-10-14 RX ADMIN — HEPARIN SODIUM 5000 UNIT(S): 5000 INJECTION INTRAVENOUS; SUBCUTANEOUS at 22:11

## 2021-10-14 RX ADMIN — Medication 100 GRAM(S): at 06:09

## 2021-10-14 RX ADMIN — OXYCODONE HYDROCHLORIDE 5 MILLIGRAM(S): 5 TABLET ORAL at 14:42

## 2021-10-14 RX ADMIN — OXYCODONE HYDROCHLORIDE 5 MILLIGRAM(S): 5 TABLET ORAL at 20:03

## 2021-10-14 RX ADMIN — OXYCODONE HYDROCHLORIDE 5 MILLIGRAM(S): 5 TABLET ORAL at 23:05

## 2021-10-14 RX ADMIN — HEPARIN SODIUM 5000 UNIT(S): 5000 INJECTION INTRAVENOUS; SUBCUTANEOUS at 14:11

## 2021-10-14 RX ADMIN — Medication 25 GM/HR: at 09:17

## 2021-10-14 RX ADMIN — OXYCODONE HYDROCHLORIDE 5 MILLIGRAM(S): 5 TABLET ORAL at 21:09

## 2021-10-14 RX ADMIN — Medication 50 GM/HR: at 01:35

## 2021-10-14 RX ADMIN — OXYCODONE HYDROCHLORIDE 5 MILLIGRAM(S): 5 TABLET ORAL at 15:40

## 2021-10-14 RX ADMIN — Medication 100 GRAM(S): at 04:51

## 2021-10-14 RX ADMIN — Medication 975 MILLIGRAM(S): at 10:15

## 2021-10-14 NOTE — LACTATION INITIAL EVALUATION - POTENTIAL FOR
ineffective breastfeeding/feeding confusion/latch on difficulty/low supply/delayed secretory activation

## 2021-10-14 NOTE — PROGRESS NOTE ADULT - SUBJECTIVE AND OBJECTIVE BOX
Labs reviewed. Medication dosage reviewed. Platelet levels on the uptrend, last level at 101. PT/PTT/INR are NOT  elevated. Patient is out of bed to chair. Epidural catheter removed easily. Blue tip intact. Back is clean. No presence of heme.   RN present.

## 2021-10-14 NOTE — CONSULT NOTE ADULT - ASSESSMENT
29 yo w/ no significant PMH, currently  s/p C/S at 39 wks. Hospital course c/b pre-eclampsia, thrombocytopenia, increased AST/ALT, and anemia concerning for HELLP syndrome, s/p 1u pRBC, 2u platelets, hematology c/s for further management    #HELLP Syndrome  - Hospital course complicated by worsening HELLP labs: Hg 11 -> 7,  -> 790, AST//176 -> 332/248, Platelet 210 -> 34  - Patient had no underlying anemia/thrombocytopenia or liver dysfunction on prior labwork   - Based on ACOG criteria and [place] criteria, consistent for HELLP syndrome  - Complications of HELLP include DIC, but fibrinogen elevated to 400s, low concern for DIC at this time  - Smear reviewed from overnight, w/ -3 schistocytes, thrombocytopenia; hematology will review again  - Cornerstone of treatment for HELLP is delivery, which has been performed already  - Lab values may initially worsen in the first 48 hours, but patient lab values already improving  - Can continue to trend CBC, LDH, Liver enzymes q12 for now  - Can transfuse to goal of Hg >7, platelet >20    ***NOTE INCOMPLETE***    ***please wait for attending attestation for final recommendations***      Hyeokchan Kwon , PGY3  Pager: [phone] 29 yo w/ no significant PMH, currently  s/p C/S at 39 wks. Hospital course c/b pre-eclampsia, thrombocytopenia, increased AST/ALT, and anemia concerning for HELLP syndrome, s/p 1u pRBC, 2u platelets, hematology c/s for further management    #HELLP Syndrome  - Hospital course complicated by worsening HELLP labs: Hg 11 -> 7,  -> 790, AST//176 -> 332/248, Platelet 210 -> 34  - Patient had no underlying anemia/thrombocytopenia or liver dysfunction on outpatient labwork   - Based on ACOG criteria and [place] criteria, consistent for HELLP syndrome  - Complications of HELLP include DIC, but fibrinogen elevated to 400s, low concern for DIC at this time  - Smear reviewed from overnight, w/ -3 schistocytes, thrombocytopenia; hematology will review again  - Cornerstone of treatment for HELLP is delivery, which has been performed already  - Lab values may initially worsen in the first 48 hours, but patient lab values already improving  - Can continue to trend CBC, LDH, Liver enzymes q12 for now  - Can transfuse to goal of Hg >7, platelet >20    ***NOTE INCOMPLETE***    ***please wait for attending attestation for final recommendations***      Hyeokchan Kwon , PGY3  Pager: [phone] 29 yo w/ no significant PMH, currently  s/p C/S at 39 wks. Hospital course c/b pre-eclampsia, thrombocytopenia, increased AST/ALT, and anemia concerning for HELLP syndrome, s/p 1u pRBC, 2u platelets, hematology c/s for further management of HELLP    #HELLP Syndrome  - Hospital course complicated by worsening HELLP labs: Hg 11 -> 7,  -> 790, hapto <20, AST//176 -> 332/248, Platelet 210 -> 34  - Patient had no underlying anemia/thrombocytopenia or liver dysfunction on outpatient labwork   - Based on ACOG criteria and [place] criteria, consistent for HELLP syndrome  - Complications of HELLP include DIC, but fibrinogen elevated to 400s, low concern for DIC at this time  - Smear reviewed from overnight, w/ 1-3 schistocytes, thrombocytopenia; hematology will review again  - Cornerstone of treatment for HELLP is delivery, which has been performed already  - Lab values may initially worsen in the first 48 hours, but patient lab values already improving  - Can continue to trend CBC, LDH, Liver enzymes q12 for now  - Can transfuse to goal of Hg >7, platelet >20    ***NOTE INCOMPLETE***    ***please wait for attending attestation for final recommendations***      Hyeokchan Kwon , PGY3  Pager: [phone] 31 yo w/ no significant PMH, currently  s/p C/S at 39 wks. Hospital course c/b pre-eclampsia, thrombocytopenia, increased AST/ALT, and anemia concerning for HELLP syndrome, s/p 1u pRBC, 2u platelets, hematology c/s for further management of HELLP    #HELLP Syndrome  - Hospital course complicated by worsening HELLP labs: Hg 11 -> 7,  -> 790, hapto <20, AST//176 -> 332/248, Platelet 210 -> 34  - Patient had no underlying anemia/thrombocytopenia or liver dysfunction on outpatient labwork   - Based on ACOG criteria and [place] criteria, consistent for HELLP syndrome  - Complications of HELLP include DIC, but fibrinogen elevated to 400s, low concern for DIC at this time  - Smear reviewed from overnight, w/ 1-3 schistocytes, true thrombocytopenia; hematology will review again  - Cornerstone of treatment for HELLP is delivery, which has been performed already  - Lab values may initially worsen in the first 48 hours, but patient lab values already improving  - Can continue to trend CBC, LDH, Liver enzymes q12 for now  - Can transfuse to goal of Hg >7, platelet >20    ***NOTE INCOMPLETE***    ***please wait for attending attestation for final recommendations***      Hyeokchan Kwon , PGY3  Pager: [phone] 31 yo w/ no significant PMH, currently  s/p C/S at 39 wks. Hospital course c/b pre-eclampsia, thrombocytopenia, increased AST/ALT, and anemia concerning for HELLP syndrome, s/p 1u pRBC, 2u platelets, hematology c/s for further management of HELLP    #HELLP Syndrome  - Hospital course complicated by worsening HELLP labs: Hg 11 -> 7,  -> 790, hapto <20, AST//176 -> 332/248, Platelet 210 -> 34  - Patient had no underlying anemia/thrombocytopenia or liver dysfunction on outpatient labwork   - Based on ACOG criteria and [place] criteria, consistent for HELLP syndrome  - Complications of HELLP include DIC, but fibrinogen elevated to 400s, low concern for DIC at this time  - Smear reviewed from overnight, w/ 1-3 schistocytes, true thrombocytopenia; consistent findings on today's smear  - Cornerstone of treatment for HELLP is delivery, which has been performed already  - Lab values may initially worsen in the first 48 hours, but patient lab values already improving  - Can continue to trend CBC, LDH, Liver enzymes q12 for now  - Can transfuse to goal of Hg >7, platelet >20    ***please wait for attending attestation for final recommendations***      Hyeokchan Kwon , PGY3  Pager: [phone]

## 2021-10-14 NOTE — PROGRESS NOTE ADULT - SUBJECTIVE AND OBJECTIVE BOX
SURGICAL INTENSIVE CARE UNIT DAILY PROGRESS NOTE    24 HOUR EVENTS:       HPI:  29 yo  @ 39.4 wks complaining of contractions increasing since 8-9pm, unable to walk or sit, pt reports no relief of firmness of abdomen in between contractions. denies vb or lof, reports +GFM. AP course uncomplicated thus far. denies fever chills ha new swelling vision changes cp sob or cough. last seen by OB last week, last here in D&T Monday VE cervix closed. EFW 7#15.    GBS: negative  meds: PNV  All: denies  PMH: denies  PSH: tonsillectomy   gyn hx: denies  ob hx: denies (13 Oct 2021 06:30)      NEURO    RESPIRATORY  RR: 15 (10-14-21 @ 02:00) (12 - 26)  SpO2: 98% (10-14-21 @ 02:00) (98% - 100%)  Wt(kg): --  Mechanical Ventilation:       CARDIOVASCULAR  HR: 79 (10-14-21 @ 02:00) (70 - 137)  BP: 108/70 (10-14-21 @ 02:00) (59/41 - 168/80)  BP(mean): 78 (10-14-21 @ 02:00) (35 - 141)  ABP: --  ABP(mean): --  Wt(kg): --  CVP(cm H2O): --        GI/NUTRITION  Diet:    GENITOURINARY  I&O's Detail    10-13 @ 07:01  -  10-14 @ 03:40  --------------------------------------------------------  IN:    IV PiggyBack: 2000 mL    IV PiggyBack: 100 mL    Lactated Ringers: 150 mL    Lactated Ringers: 300 mL    Lactated Ringers: 375 mL    Lactated Ringers Bolus: 500 mL    Magnesium Sulfate: 300 mL    Magnesium Sulfate: 700 mL    Oral Fluid: 200 mL    Oxytocin: 300 mL    Platelets - Single Donor: 555 mL    PRBCs (Packed Red Blood Cells): 300 mL  Total IN: 5780 mL    OUT:    Blood Loss (mL): 1100 mL    Indwelling Catheter - Urethral (mL): 3645 mL  Total OUT: 4745 mL    Total NET: 1035 mL        Weight (kg): 64 (10-13 @ 07:05)  10-13    136  |  104  |  9   ----------------------------<  153<H>  4.1   |  17<L>  |  0.87    Ca    6.5<LL>      13 Oct 2021 20:03  Mg     6.60     10-13    TPro  5.1<L>  /  Alb  3.0<L>  /  TBili  0.8  /  DBili  x   /  AST  332<H>  /  ALT  248<H>  /  AlkPhos  118  10-13      HEMATOLOGIC                        8.2    12.61 )-----------( 79       ( 14 Oct 2021 03:10 )             24.0     PT/INR - ( 14 Oct 2021 03:10 )   PT: 12.4 sec;   INR: 1.08 ratio         PTT - ( 14 Oct 2021 03:10 )  PTT:25.1 sec    INFECTIOUS DISEASES  RECENT CULTURES:      ENDOCRINE  CAPILLARY BLOOD GLUCOSE          IMAGING: SURGICAL INTENSIVE CARE UNIT DAILY PROGRESS NOTE    24 HOUR EVENTS:   - Mg elevated to 7.9; gtt held  - plt 79 after additional 2 units  - hgb up to 8.2 after 1u prbc    HPI:  29 yo w/ no significant PMH, currently  s/p spontaneous vaginal delivery at 39 wks following induction for severe pre-eclampsia/HELLP syndrome tonight (10/13). Pt w/ symptoms of N/V progressing to development of LUQ abd pain today (10/13), prompting her admission to L&D. Initial labs included thrombocytopenia, transaminitis, and anemia, raising concern for pre-ecclampsia/HELLP syndrome, for which the pt was ultimately induced. Following delivery pt remained hemodynamically stable, however repeat labs demonstrated worsening lab abnormalities, for which the SICU was consulted for hemodynamic monitoring.      Gen: A&Ox4, NAD  HEENT: Atraumatic. Mucous membranes moist, no scleral icterus. Pale conjunctiva  CV: RRR . No murmurs.  1+ bilat LE edema.  Distal pulses 2+. Capillary refill < 2 seconds.  Resp: Respirations unlabored. CTAB, no rales, rhonchi, or wheezes.  GI: Abdomen gravid, soft, mild tenderness over fundus, otherwise non tender to palpation. + BS.  Skin/MSK: No open wounds. No ecchymosis appreciated. No rashes.  Neuro: Following commands. Moving extremities spontaneously.  Psych: Appropriate mood, cooperative    GBS: negative  meds: PNV  All: denies  PMH: denies  PSH: tonsillectomy   gyn hx: denies  ob hx: denies (13 Oct 2021 06:30)      NEURO    RESPIRATORY  RR: 15 (10-14-21 @ 02:00) (12 - 26)  SpO2: 98% (10-14-21 @ 02:00) (98% - 100%)  Wt(kg): --  Mechanical Ventilation:       CARDIOVASCULAR  HR: 79 (10-14-21 @ 02:00) (70 - 137)  BP: 108/70 (10-14-21 @ 02:00) (59/41 - 168/80)  BP(mean): 78 (10-14-21 @ 02:00) (35 - 141)  ABP: --  ABP(mean): --  Wt(kg): --  CVP(cm H2O): --        GI/NUTRITION  Diet:    GENITOURINARY  I&O's Detail    10-13 @ 07:01  -  10-14 @ 03:40  --------------------------------------------------------  IN:    IV PiggyBack: 2000 mL    IV PiggyBack: 100 mL    Lactated Ringers: 150 mL    Lactated Ringers: 300 mL    Lactated Ringers: 375 mL    Lactated Ringers Bolus: 500 mL    Magnesium Sulfate: 300 mL    Magnesium Sulfate: 700 mL    Oral Fluid: 200 mL    Oxytocin: 300 mL    Platelets - Single Donor: 555 mL    PRBCs (Packed Red Blood Cells): 300 mL  Total IN: 5780 mL    OUT:    Blood Loss (mL): 1100 mL    Indwelling Catheter - Urethral (mL): 3645 mL  Total OUT: 4745 mL    Total NET: 1035 mL        Weight (kg): 64 (10-13 @ 07:05)  10-13    136  |  104  |  9   ----------------------------<  153<H>  4.1   |  17<L>  |  0.87    Ca    6.5<LL>      13 Oct 2021 20:03  Mg     6.60     10-13    TPro  5.1<L>  /  Alb  3.0<L>  /  TBili  0.8  /  DBili  x   /  AST  332<H>  /  ALT  248<H>  /  AlkPhos  118  10-13      HEMATOLOGIC                        8.2    12.61 )-----------( 79       ( 14 Oct 2021 03:10 )             24.0     PT/INR - ( 14 Oct 2021 03:10 )   PT: 12.4 sec;   INR: 1.08 ratio         PTT - ( 14 Oct 2021 03:10 )  PTT:25.1 sec    INFECTIOUS DISEASES  RECENT CULTURES:      ENDOCRINE  CAPILLARY BLOOD GLUCOSE          IMAGING: SURGICAL INTENSIVE CARE UNIT DAILY PROGRESS NOTE    24 HOUR EVENTS:   - Mg elevated to 7.9; gtt held  - plt 79 after additional 2 units  - hgb up to 8.2 after 1u prbc    HPI:  31 yo w/ no significant PMH, currently  s/p spontaneous vaginal delivery at 39 wks following induction for severe pre-eclampsia/HELLP syndrome tonight (10/13). Pt w/ symptoms of N/V progressing to development of LUQ abd pain today (10/13), prompting her admission to L&D. Initial labs included thrombocytopenia, transaminitis, and anemia, raising concern for pre-ecclampsia/HELLP syndrome, for which the pt was ultimately induced. Following delivery pt remained hemodynamically stable, however repeat labs demonstrated worsening lab abnormalities, for which the SICU was consulted for hemodynamic monitoring.      Gen: A&Ox4, NAD  HEENT: Atraumatic. Mucous membranes moist, no scleral icterus. Pale conjunctiva  CV: RRR . No murmurs.  1+ bilat LE edema.  Distal pulses 2+. Capillary refill < 2 seconds.  Resp: Respirations unlabored. CTAB, no rales, rhonchi, or wheezes.  GI: Abdomen gravid, soft, mild tenderness over fundus, otherwise non tender to palpation. + BS.  Skin/MSK: No open wounds. No ecchymosis appreciated. No rashes.  Neuro: Following commands. Moving extremities spontaneously.  Psych: Appropriate mood, cooperative    GBS: negative  meds: PNV  All: denies  PMH: denies  PSH: tonsillectomy   gyn hx: denies  ob hx: denies (13 Oct 2021 06:30)      NEURO    RESPIRATORY  RR: 15 (10-14-21 @ 02:00) (12 - 26)  SpO2: 98% (10-14-21 @ 02:00) (98% - 100%)  Wt(kg): --  Mechanical Ventilation:       CARDIOVASCULAR  HR: 79 (10-14-21 @ 02:00) (70 - 137)  BP: 108/70 (10-14-21 @ 02:00) (59/41 - 168/80)  BP(mean): 78 (10-14-21 @ 02:00) (35 - 141)    GI/NUTRITION  Diet: Reg    GENITOURINARY  I&O's Detail    I&O's Detail    13 Oct 2021 07:01  -  14 Oct 2021 07:00  --------------------------------------------------------  IN:    IV PiggyBack: 200 mL    IV PiggyBack: 2000 mL    Lactated Ringers: 150 mL    Lactated Ringers: 300 mL    Lactated Ringers: 375 mL    Lactated Ringers Bolus: 500 mL    Magnesium Sulfate: 725 mL    Magnesium Sulfate: 300 mL    Oral Fluid: 200 mL    Oxytocin: 300 mL    Platelets - Single Donor: 555 mL    PRBCs (Packed Red Blood Cells): 300 mL  Total IN: 5905 mL    OUT:    Blood Loss (mL): 1100 mL    Indwelling Catheter - Urethral (mL): 4920 mL  Total OUT: 6020 mL    Total NET: -115 mL      14 Oct 2021 07:01  -  14 Oct 2021 11:43  --------------------------------------------------------  IN:    Magnesium Sulfate: 55 mL  Total IN: 55 mL    OUT:    Indwelling Catheter - Urethral (mL): 460 mL  Total OUT: 460 mL    Total NET: -405 mL    Weight (kg): 64 (10-13 @ 07:05)  10-13    136  |  104  |  9   ----------------------------<  153<H>  4.1   |  17<L>  |  0.87    Ca    6.5<LL>      13 Oct 2021 20:03  Mg     6.60     10-13    TPro  5.1<L>  /  Alb  3.0<L>  /  TBili  0.8  /  DBili  x   /  AST  332<H>  /  ALT  248<H>  /  AlkPhos  118  10-13      HEMATOLOGIC                        8.2    12.61 )-----------( 79       ( 14 Oct 2021 03:10 )             24.0     PT/INR - ( 14 Oct 2021 03:10 )   PT: 12.4 sec;   INR: 1.08 ratio    PTT - ( 14 Oct 2021 03:10 )  PTT:25.1 sec       SURGICAL INTENSIVE CARE UNIT DAILY PROGRESS NOTE    24 HOUR EVENTS:   - Mg elevated to 7.9; gtt paused  - plt 79 after 2 units transfused  - hgb up to 8.2 after 1u prbc    HPI:  31 yo w/ no significant PMH, currently  s/p spontaneous vaginal delivery at 39 wks following induction for severe pre-eclampsia/HELLP syndrome tonight (10/13). Pt w/ symptoms of N/V progressing to development of LUQ abd pain today (10/13), prompting her admission to L&D. Initial labs included thrombocytopenia, transaminitis, and anemia, raising concern for pre-ecclampsia/HELLP syndrome, for which the pt was ultimately induced. Following delivery pt remained hemodynamically stable, however repeat labs demonstrated worsening lab abnormalities, for which the SICU was consulted for hemodynamic monitoring.      Gen: A&Ox4, NAD  HEENT: Atraumatic. Mucous membranes moist, no scleral icterus. Pale conjunctiva  CV: RRR . No murmurs.  1+ bilat LE edema.  Distal pulses 2+. Capillary refill < 2 seconds.  Resp: Respirations unlabored. CTAB, no rales, rhonchi, or wheezes.  GI: soft, appropriately tender to palpation, fundus firm below level of umbilicus, + BS, incision c/d/i  Skin/MSK: No open wounds. No ecchymosis appreciated. No rashes.  Neuro: Following commands. Moving extremities spontaneously.   Psych: Appropriate mood, cooperative    GBS: negative  meds: PNV  All: denies  PMH: denies  PSH: tonsillectomy   gyn hx: denies  ob hx: denies (13 Oct 2021 06:30)    NEURO    RESPIRATORY  RR: 15 (10-14-21 @ 02:00) (12 - 26)  SpO2: 98% (10-14-21 @ 02:00) (98% - 100%)  Wt(kg): --  Mechanical Ventilation:       CARDIOVASCULAR  HR: 79 (10-14-21 @ 02:00) (70 - 137)  BP: 108/70 (10-14-21 @ 02:00) (59/41 - 168/80)  BP(mean): 78 (10-14-21 @ 02:00) (35 - 141)    GI/NUTRITION  Diet: Reg    GENITOURINARY  I&O's Detail    I&O's Detail    13 Oct 2021 07:01  -  14 Oct 2021 07:00  --------------------------------------------------------  IN:    IV PiggyBack: 200 mL    IV PiggyBack: 2000 mL    Lactated Ringers: 150 mL    Lactated Ringers: 300 mL    Lactated Ringers: 375 mL    Lactated Ringers Bolus: 500 mL    Magnesium Sulfate: 725 mL    Magnesium Sulfate: 300 mL    Oral Fluid: 200 mL    Oxytocin: 300 mL    Platelets - Single Donor: 555 mL    PRBCs (Packed Red Blood Cells): 300 mL  Total IN: 5905 mL    OUT:    Blood Loss (mL): 1100 mL    Indwelling Catheter - Urethral (mL): 4920 mL  Total OUT: 6020 mL    Total NET: -115 mL      14 Oct 2021 07:01  -  14 Oct 2021 11:43  --------------------------------------------------------  IN:    Magnesium Sulfate: 55 mL  Total IN: 55 mL    OUT:    Indwelling Catheter - Urethral (mL): 460 mL  Total OUT: 460 mL    Total NET: -405 mL    Weight (kg): 64 (10-13 @ 07:05)  10-13    136  |  104  |  9   ----------------------------<  153<H>  4.1   |  17<L>  |  0.87    Ca    6.5<LL>      13 Oct 2021 20:03  Mg     6.60     10-13    TPro  5.1<L>  /  Alb  3.0<L>  /  TBili  0.8  /  DBili  x   /  AST  332<H>  /  ALT  248<H>  /  AlkPhos  118  10-13      HEMATOLOGIC                        8.2    12.61 )-----------( 79       ( 14 Oct 2021 03:10 )             24.0     PT/INR - ( 14 Oct 2021 03:10 )   PT: 12.4 sec;   INR: 1.08 ratio    PTT - ( 14 Oct 2021 03:10 )  PTT:25.1 sec       SURGICAL INTENSIVE CARE UNIT DAILY PROGRESS NOTE    24 HOUR EVENTS:   - Mg elevated to 7.9; gtt paused  - plt 79 after 2 units transfused  - hgb up to 8.2 after 1u prbc    HPI:  31 yo w/ no significant PMH, currently  s/p spontaneous vaginal delivery at 39 wks following induction for severe pre-eclampsia/HELLP syndrome tonight (10/13). Pt w/ symptoms of N/V progressing to development of LUQ abd pain today (10/13), prompting her admission to L&D. Initial labs included thrombocytopenia, transaminitis, and anemia, raising concern for pre-ecclampsia/HELLP syndrome, for which the pt was ultimately induced. Following delivery pt remained hemodynamically stable, however repeat labs demonstrated worsening lab abnormalities, for which the SICU was consulted for hemodynamic monitoring.    Gen: A&Ox4, NAD  HEENT: Atraumatic. Mucous membranes moist, no scleral icterus. Pale conjunctiva  CV: RRR . No murmurs.  1+ bilat LE edema.  Distal pulses 2+. Capillary refill < 2 seconds.  Resp: Respirations unlabored. CTAB, no rales, rhonchi, or wheezes.  GI: soft, appropriately tender to palpation, fundus firm below level of umbilicus, + BS, incision c/d/i  Skin/MSK: No open wounds. No ecchymosis appreciated. No rashes.  Neuro: Following commands. Moving extremities spontaneously. Hyperreflexia of brachial, brachioradialis b/l.  Psych: Appropriate mood, cooperative    GBS: negative  meds: PNV  All: denies  PMH: denies  PSH: tonsillectomy   gyn hx: denies  ob hx: denies (13 Oct 2021 06:30)    NEURO    RESPIRATORY  RR: 15 (10-14-21 @ 02:00) (12 - 26)  SpO2: 98% (10-14-21 @ 02:00) (98% - 100%)  Wt(kg): --  Mechanical Ventilation:       CARDIOVASCULAR  HR: 79 (10-14-21 @ 02:00) (70 - 137)  BP: 108/70 (10-14-21 @ 02:00) (59/41 - 168/80)  BP(mean): 78 (10-14-21 @ 02:00) (35 - 141)    GI/NUTRITION  Diet: Reg    GENITOURINARY  I&O's Detail    I&O's Detail    13 Oct 2021 07:01  -  14 Oct 2021 07:00  --------------------------------------------------------  IN:    IV PiggyBack: 200 mL    IV PiggyBack: 2000 mL    Lactated Ringers: 150 mL    Lactated Ringers: 300 mL    Lactated Ringers: 375 mL    Lactated Ringers Bolus: 500 mL    Magnesium Sulfate: 725 mL    Magnesium Sulfate: 300 mL    Oral Fluid: 200 mL    Oxytocin: 300 mL    Platelets - Single Donor: 555 mL    PRBCs (Packed Red Blood Cells): 300 mL  Total IN: 5905 mL    OUT:    Blood Loss (mL): 1100 mL    Indwelling Catheter - Urethral (mL): 4920 mL  Total OUT: 6020 mL    Total NET: -115 mL      14 Oct 2021 07:01  -  14 Oct 2021 11:43  --------------------------------------------------------  IN:    Magnesium Sulfate: 55 mL  Total IN: 55 mL    OUT:    Indwelling Catheter - Urethral (mL): 460 mL  Total OUT: 460 mL    Total NET: -405 mL    Weight (kg): 64 (10-13 @ 07:05)  10-13    136  |  104  |  9   ----------------------------<  153<H>  4.1   |  17<L>  |  0.87    Ca    6.5<LL>      13 Oct 2021 20:03  Mg     6.60     10-13    TPro  5.1<L>  /  Alb  3.0<L>  /  TBili  0.8  /  DBili  x   /  AST  332<H>  /  ALT  248<H>  /  AlkPhos  118  10-13      HEMATOLOGIC                        8.2    12.61 )-----------( 79       ( 14 Oct 2021 03:10 )             24.0     PT/INR - ( 14 Oct 2021 03:10 )   PT: 12.4 sec;   INR: 1.08 ratio    PTT - ( 14 Oct 2021 03:10 )  PTT:25.1 sec

## 2021-10-14 NOTE — CONSULT NOTE ADULT - ATTENDING COMMENTS
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 Plt count and other lab parameters improved, BP decreased. I agree with the assessment and plan as stated in the consult note above by Dr Jang.

## 2021-10-14 NOTE — CHART NOTE - NSCHARTNOTEFT_GEN_A_CORE
: Ryan Masterson M.D. / Vic Rubio M.D.   [x] patient identity confirmed     INDICATION:     PROCEDURE:   [x] LIMITED CHEST  [x] LIMITED ECHO, TRANSTHORACIC  [x] LIMITED ABDOMINAL  [ ] LIMITED RETROPERITONEAL  [ ] LIMITED DVT  [x] OTHER: EFAST    FINDINGS:     Lung:   Appropriate lung sliding at the visceral-parietal pleural interface bilaterally.   Dominant A-line pattern throughout bilateral anterior lung fields, no B-lines noted.   No free fluid visualized in the right/left pleural space.     Cardiac:   No free fluid in the pericardial space visualized.   No left ventricular systolic dysfunction appreciated. Grossly normal EF by E-point septal separation (EPSS=0.78cm) and global function.   No right ventricular enlargement visualized.     IVC: min diameter 1.19cm, max diameter 1.54 cm w/ 23% collapse by respiratory variation.     EFAST:   Hepatorenal (RUQ): no free fluid identified  Right Pleural Space: no free fluid identified  Perisplenic (LUQ): no free fluid identified  Left Pleural Space: no free fluid identified  Suprapubic: no free fluid identified  Pericardium: no pericardial effusion identified  Right Anterior Chest Wall: pleural sliding present  Left Anterior Chest Wall: pleural sliding present    Additional findings: none    INTERPRETATION:  Proper aeration throughout all lung fields. No PLEFF's.   No systolic/diastolic cardiac dysfunction appreciated. IVC less than 2.5cm w/ less than 50% collapse, correlating to CVP 10-15 cmH2O.       Performed by Ryan Masterson M.D.  PGY-1, Anesthesiology  [id number] / [phone] : Ryan Masterson M.D. / Vic Rubio M.D.   [x] patient identity confirmed     INDICATION:     PROCEDURE:   [x] LIMITED CHEST  [x] LIMITED ECHO, TRANSTHORACIC  [x] LIMITED ABDOMINAL  [ ] LIMITED RETROPERITONEAL  [ ] LIMITED DVT  [x] OTHER: EFAST    FINDINGS:     Lung:   Appropriate lung sliding at the visceral-parietal pleural interface bilaterally.   Dominant A-line pattern throughout bilateral anterior lung fields, no B-lines noted.   No free fluid visualized in the right/left pleural space.     Cardiac:   No free fluid in the pericardial space visualized.   No left ventricular systolic dysfunction appreciated. Grossly normal EF by E-point septal separation (EPSS=0.78cm) and global function.   No right ventricular enlargement visualized.     IVC: min diameter 1.19cm, max diameter 1.54 cm w/ 23% collapse by respiratory variation.     EFAST:   Hepatorenal (RUQ): no free fluid identified  Right Pleural Space: no free fluid identified  Perisplenic (LUQ): no free fluid identified  Left Pleural Space: no free fluid identified  Suprapubic: no free fluid identified  Pericardium: no pericardial effusion identified  Right Anterior Chest Wall: pleural sliding present  Left Anterior Chest Wall: pleural sliding present    Additional findings: none    INTERPRETATION:  Proper aeration throughout all lung fields. No PLEFF's.   No systolic/diastolic cardiac dysfunction appreciated. IVC less than 2.5cm w/ less than 50% collapse, correlating to CVP 10-15 cmH2O.   No michaela-hepatic fluid collection visualized.     Performed by Ryan Masterson M.D.  PGY-1, Anesthesiology  [id number] / [phone] : Ryan Masterson M.D. / Vic Rubio M.D.   [x] patient identity confirmed     INDICATION:     PROCEDURE:   [x] LIMITED CHEST  [x] LIMITED ECHO, TRANSTHORACIC  [x] LIMITED ABDOMINAL  [ ] LIMITED RETROPERITONEAL  [ ] LIMITED DVT  [x] OTHER: EFAST    FINDINGS:     Lung:   Appropriate lung sliding at the visceral-parietal pleural interface bilaterally.   Dominant A-line pattern throughout bilateral anterior lung fields, no B-lines noted.   No free fluid visualized in the right/left pleural space.     Cardiac:   No free fluid in the pericardial space visualized.   No left ventricular systolic dysfunction appreciated. Grossly normal EF by E-point septal separation (EPSS=0.78cm) and global function.   No right ventricular enlargement visualized.     IVC: min diameter 1.19cm, max diameter 1.54 cm w/ 23% collapse by respiratory variation.     EFAST:   Hepatorenal (RUQ): no free fluid identified  Right Pleural Space: no free fluid identified  Perisplenic (LUQ): no free fluid identified  Left Pleural Space: no free fluid identified  Suprapubic: no free fluid identified  Pericardium: no pericardial effusion identified  Right Anterior Chest Wall: pleural sliding present  Left Anterior Chest Wall: pleural sliding present    Additional findings: none    INTERPRETATION:  Proper aeration throughout all lung fields. No PLEFF's.   No systolic/diastolic cardiac dysfunction appreciated. IVC less than 2.5cm w/ less than 50% collapse, correlating to CVP 10-15 cmH2O.   No michaela-hepatic fluid collection visualized.     Performed by Ryan Masterson M.D.  PGY-1, Anesthesiology  [id number] / [phone]    Agree/RB

## 2021-10-14 NOTE — PROGRESS NOTE ADULT - ASSESSMENT
ASSESSMENT:  29 yo w/ no significant PMH, currently  s/p spontaneous vaginal delivery at 39 wks following induction for severe pre-eclampsia/HELLP syndrome tonight (10/13). Pt w/ symptoms of N/V progressing to development of LUQ abd pain today (10/13), prompting her admission to L&D. Initial labs included thrombocytopenia, transaminitis, and anemia, raising concern for pre-ecclampsia/HELLP syndrome, for which the pt was ultimately induced. Following delivery pt remained hemodynamically stable, however repeat labs demonstrated worsening lab abnormalities, for which the SICU was consulted for hemodynamic monitoring.    PLAN:  NEURO:  -A&Ox4; denies HA/vision changes  -monitor mental status  -epidural in place (keeping in place in setting of thrombocytopenia)  -mag gtt - goal serum mag 4-7 mg/dL    RESPIRATORY:   -monitor respiratory status  -continuous pulse oxt  -monitor for pulmonary edema    CARDIOVASCULAR:  -monitor VS and perfusion status  -procardia 30 mg daily in setting of concern for pre-eclampsia    GI/NUTRITION:  -transaminitis; denies abd pain at this time  -NPO  -mylicon/mag hydroxide  -monitor for abd pain    GENITOURINARY/RENAL:  -s/p 2L IVF during delivery  -howard in place  -monitor UOP/Cr  -LR @ 75 cc/hr  -monitor electrolytes; LFTs  -serum mag goal 4-7 mg/dL    HEMATOLOGIC:  -s/p 1u plt prior to delivery  -hgb trending down 10.2>8.3>7.6;   -monitor H&H  -heme consulted; goal plt >20,000; goal hgb >7  -heme to see pt in SICU  -DVT ppx? - SCDs vs heparin/lovenox    INFECTIOUS DISEASE:  -monitor for fevers; trend WBC    ENDOCRINE:  -trend glucose    Lines:  PIV x 2  howard  epidural in place    GOC/CODE STATUS:  Full Code    DISPO: SICU     ASSESSMENT:  31 yo w/ no significant PMH, currently  s/p spontaneous vaginal delivery at 39 wks following induction for severe pre-eclampsia/HELLP syndrome tonight (10/13). Pt w/ symptoms of N/V progressing to development of LUQ abd pain today (10/13), prompting her admission to L&D. Initial labs included thrombocytopenia, transaminitis, and anemia, raising concern for pre-ecclampsia/HELLP syndrome, for which the pt was ultimately induced. Following delivery pt remained hemodynamically stable, however repeat labs demonstrated worsening lab abnormalities, for which the SICU was consulted for hemodynamic monitoring.    PLAN:  NEURO:  -A&Ox4; denies HA/vision changes  -monitor mental status  -epidural in place (keeping in place in setting of thrombocytopenia)  -mag gtt - goal serum mag 4-7 mg/dL    RESPIRATORY:   -monitor respiratory status  -continuous pulse oxt  -monitor for pulmonary edema    CARDIOVASCULAR:  -monitor VS and perfusion status  -procardia 30 mg daily in setting of concern for pre-eclampsia    GI/NUTRITION:  -transaminitis; denies abd pain at this time  -CLD  -mylicon/mag hydroxide  -monitor for abd pain  -trend LFTs    GENITOURINARY/RENAL:  -s/p 2L IVF during delivery  -howard in place  -monitor UOP/Cr  -LR @ 75 cc/hr  -monitor electrolytes; q6 mag  -serum mag goal 4-7 mg/dL    HEMATOLOGIC:  -s/p 3u plt and 1u prbc  -hgb trending down 10.2>8.3>7.6;   -heme consulted; goal plt >20,000; goal hgb >7  -SCDs  -holding DVT ppx   -q6 cbc; trend PT/PTT/fibrinogen/LDH    INFECTIOUS DISEASE:  -monitor for fevers; trend WBC    ENDOCRINE:  -trend glucose    Lines:  PIV x 2  howard  epidural in place    GOC/CODE STATUS:  Full Code    DISPO: SICU     ASSESSMENT:  31 yo w/ no significant PMH, currently  s/p spontaneous vaginal delivery at 39 wks following induction for severe pre-eclampsia/HELLP syndrome tonight (10/13). Pt w/ symptoms of N/V progressing to development of LUQ abd pain today (10/13), prompting her admission to L&D. Initial labs included thrombocytopenia, transaminitis, and anemia, raising concern for pre-ecclampsia/HELLP syndrome, for which the pt was ultimately induced. Following delivery pt remained hemodynamically stable, however repeat labs demonstrated worsening lab abnormalities, for which the SICU was consulted for hemodynamic monitoring.    PLAN:  NEURO:  -A&Ox4; denies HA/vision changes  -monitor mental status  -epidural in place (keeping in place in setting of thrombocytopenia)  -mag gtt - goal serum mag 4-7 mg/dL    RESPIRATORY:   -monitor respiratory status  -continuous pulse oxt  -monitor for pulmonary edema    CARDIOVASCULAR:  -monitor VS and perfusion status  -procardia 30 mg daily in setting of concern for pre-eclampsia    GI/NUTRITION:  -transaminitis; denies abd pain at this time  -CLD  -mylicon/mag hydroxide  -monitor for abd pain  -trend LFTs    GENITOURINARY/RENAL:  -s/p 2L IVF during delivery  -howard in place  -monitor UOP/Cr  -LR @ 75 cc/hr  -monitor electrolytes; q6 mag  -serum mag goal 4-7 mg/dL; hold if over 7 (plan for dc at 24 hrs)    HEMATOLOGIC:  -s/p 3u plt and 1u prbc  -hgb trending down 10.2>8.3>7.6;   -heme consulted; goal plt >20,000; goal hgb >7  -SCDs  -holding DVT ppx   -q6 cbc; trend PT/PTT/fibrinogen/LDH    INFECTIOUS DISEASE:  -monitor for fevers; trend WBC    ENDOCRINE:  -trend glucose    Lines:  PIV x 2  howard  epidural in place    GOC/CODE STATUS:  Full Code    DISPO: SICU     ASSESSMENT:  29 yo w/ no significant PMH, P1 s/p pLTCS at 39 wks for severe pre-eclampsia/HELLP syndrome (10/13). Pt presented with symptoms of N/V progressing to development of LUQ abd pain today (10/13), prompting her admission to L&D. Initial labs included thrombocytopenia, transaminitis, and anemia, raising concern for pre-ecclampsia/HELLP syndrome. IOL was initiated, however had worsening lab abnormalities and was remote from delivery, and  section was recommended. Following delivery pt remained hemodynamically stable, however SICU was consulted for hemodynamic monitoring in the setting of worsening lab abnormalities.    PLAN:    NEURO/PAIN:  - A&Ox4  - denies HA/vision changes  - Magnesium gtt for seizure prophylaxis 24h postpartum - goal serum mag 4-7 mg/d, elevated to 7.9 overnight, so held and restarted at rate of 1 this morning  - PO Tylenol, Oxycodone PRN for post partum pain managment - hold motrin in setting of thrombocytopenia    RESPIRATORY:   - monitor respiratory status, pulmonary edema  - continuous pulse ox    CARDIOVASCULAR:  - monitor VS and perfusion status  - procardia 30 mg daily in setting of concern for pre-eclampsia - held today due to BP's in the 110s/60s    GI/NUTRITION:  - transaminitis downtrending from 332/248 to 212/207, continue to monitor q6  - asymptomatic, denies abdominal pain  - Regular diet  - mylicon/mag hydroxide PRN for constipation    GENITOURINARY/RENAL:  - Howard in place, to be d/c'd today  - monitor UOP/Cr  - monitor electrolytes    HEMATOLOGIC:  - H/H stabilizing 10.2>8.3>7.6>8.2>9.2; LDH   elevated, 1-3 schistocytes seen on smear  - s/p 2u plts and 1u pRBC, H/h now appropriate with QBL 1100  - heme consulted; goal plt >20,000; goal hgb >7  - SCDs  - Heparin 5000 q8 for DVT ppx  - q6 HELLP labs; trend PT/PTT/fibrinogen/LDH    INFECTIOUS DISEASE:  - no active issues  - monitor for fevers; trend WBC    ENDOCRINE:  - no active issues    Lines:  PIV x 2  howard  PCEA in place, non functional, to be removed by anesthesia when thrombocytopenia improves    GOC/CODE STATUS:  Full Code    DISPO: SICU

## 2021-10-14 NOTE — LACTATION INITIAL EVALUATION - LACTATION INTERVENTIONS
assisted to put baby to rt. breast in football hold position with deep latch and baby noted to suck with stimulation;  pt. was shown how to use electric breast pump and encouraged to do so at least 8x/24 hours, if she is unable to breastfeed/initiate/review hand expression/initiate/review pumping guidelines and safe milk handling/initiate/review techniques for position and latch/reviewed importance of monitoring infant diapers, the breastfeeding log, and minimum output each day

## 2021-10-14 NOTE — PROGRESS NOTE ADULT - ASSESSMENT
Ms. Newton is a 30y  status post POD#1 pLTCS after     #Neuro: pain well controlled w/ current regimen, epidural remains in place   #CV: Hemodynamically stable currently, AM cbc pending, H/H 8.2/24.0, Plt 79, L status post 3plt, 1U pRBCs, LFTs now downtrending 250/202 from 332/248   #Pulm: ROS and PE unremarkable, pt encouraged to use incentive spirometer  #ID: Afebrile, no signs or sx of infection  #GI: tolerating reg diet. Zofran/Simethicone/Senna prn  #: UOP adequate  Arreaga in place to be d/c today   #FEN: SLIV, CLD   #Heme: SCDs while in bed, and early ambulation encouraged for DVT prophylaxis    Appreciate excellent LINDA care      Essence Farmer MD  OBGYN PGY3 Ms. Newton is a 30y  status post POD#1 pLTCS after     #Neuro: pain well controlled w/ current regimen, epidural remains in place   #CV: Hemodynamically stable currently, AM cbc pending, H/H 8.2/24.0, Plt 79, L status post 3plt, 1U pRBCs, LFTs now downtrending 250/202 from 332/248   #Pulm: ROS and PE unremarkable, pt encouraged to use incentive spirometer  #ID: Afebrile, no signs or sx of infection  #GI: tolerating reg diet. Zofran/Simethicone/Senna prn  #: UOP adequate  Arreaga in place to be d/c today   #FEN: SLIV, CLD   #Heme: SCDs while in bed, and early ambulation encouraged for DVT prophylaxis    Appreciate excellent LINDA care      Essence Farmer MD  OBGYN PGY3      MFM Fellow Addendum    POD#1 s/p PCS in setting of sPEC/HELLP w/ profound thrombocytopenia and elevated transamnitis. Now VSS. Labs trending back to normal. From our perspective pt clear to be transfered back to post partum floor. will cnt to follow    Patient seen with Dr. Trotter (MFM attending)    Roldan Rendon M.D. PGY-5  Maternal Fetal Medicine Fellow   Ms. Newton is a 30y  status post POD#1 pLTCS for HELLP Syndrome.     #Neuro: pain well controlled w/ current regimen, epidural remains in place   #CV: Hemodynamically stable currently, AM cbc pending, H/H 8.2/24.0, Plt 79, L status post 3plt, 1U pRBCs, LFTs now downtrending 250/202 from 332/248   #Pulm: ROS and PE unremarkable, pt encouraged to use incentive spirometer  #ID: Afebrile, no signs or sx of infection  #GI: tolerating reg diet. Zofran/Simethicone/Senna prn  #: UOP adequate  Arreaga in place to be d/c today   #FEN: SLIV, CLD   #Heme: SCDs while in bed, and early ambulation encouraged for DVT prophylaxis    Appreciate excellent LINDA care      Essence Farmer MD  OBGYN PGY3      MFM Fellow Addendum    POD#1 s/p PCS in setting of sPEC/HELLP w/ profound thrombocytopenia and elevated transaminitis Now VSS. Labs trending back to normal. From our perspective pt clear to be transferred back to post partum floor. Will continue to follow    Patient seen with Dr. Trotter (MFM attending)    Roldan Rendon M.D. PGY-5  Maternal Fetal Medicine Fellow

## 2021-10-14 NOTE — PROVIDER CONTACT NOTE (OTHER) - ASSESSMENT
Pt denies any other signs or symptoms of transfusion reaction; denies itching, shortness of breath, absence of fever post transfusion rectal temperature assessed 97.4

## 2021-10-14 NOTE — CONSULT NOTE ADULT - SUBJECTIVE AND OBJECTIVE BOX
HPI:  31 yo  @ 39.4 wks complaining of contractions increasing since 8-9pm, unable to walk or sit, pt reports no relief of firmness of abdomen in between contractions. denies vb or lof, reports +GFM. AP course uncomplicated thus far. denies fever chills ha new swelling vision changes cp sob or cough. last seen by OB last week, last here in D&T Monday VE cervix closed. EFW 7#15.    Hospital course c/b thrombocytopenia to 70s on admission, downtrended to 40s prior to delivery. Elevated AST/ALT to 300s, Hg 11 -> 7, w/ , concerning for HELLP. Pt underwent C/S, w/o complication, transfused 1u pRBC and 2u platelets. Found to be hypertensive as well, started on Mg, currently on Mg gtt. Hematology c/s overnight for concern for HELLP.     Pt seen this morning. Reports no acute complaints, mild cramping abd pain. Denies fevers, chills, chest p/p, sob, n/v/d skin changes, headache.     GBS: negative  meds: PNV  All: denies  PMH: denies  PSH: tonsillectomy   gyn hx: denies  ob hx: denies (13 Oct 2021 06:30)      Allergies    No Known Allergies    Intolerances        MEDICATIONS  (STANDING):  diphtheria/tetanus/pertussis (acellular) Vaccine (ADAcel) 0.5 milliLiter(s) IntraMuscular once  heparin   Injectable 5000 Unit(s) SubCutaneous every 8 hours  magnesium sulfate Infusion 1 Gm/Hr (25 mL/Hr) IV Continuous <Continuous>    MEDICATIONS  (PRN):  diphenhydrAMINE 25 milliGRAM(s) Oral every 6 hours PRN Pruritus  lanolin Ointment 1 Application(s) Topical every 6 hours PRN Sore Nipples  magnesium hydroxide Suspension 30 milliLiter(s) Oral two times a day PRN Constipation  oxyCODONE    IR 5 milliGRAM(s) Oral every 3 hours PRN Moderate to Severe Pain (4-10)  oxyCODONE    IR 5 milliGRAM(s) Oral once PRN Moderate to Severe Pain (4-10)  simethicone 80 milliGRAM(s) Chew every 4 hours PRN Gas      PAST MEDICAL & SURGICAL HISTORY:  No pertinent past medical history    S/P tonsillectomy          FAMILY HISTORY:      SOCIAL HISTORY: No EtOH, no tobacco    REVIEW OF SYSTEMS:    CONSTITUTIONAL: no fever  EYES/ENT: No visual changes;  no throat pain   NECK: No pain or stiffness  RESPIRATORY: no sob  CARDIOVASCULAR: No chest pain or palpitations  GASTROINTESTINAL: +mild abd cramping, No epigastric pain. No NV/D/C  GENITOURINARY: No dysuria, change in frequency or hematuria  NEUROLOGICAL: No numbness or weakness  SKIN: No itching, burning, rashes, or lesions   Psych: No depression   MSK: no joint pain  Allergy: no urticaria         T(F): 97.8 (10-14-21 @ 08:00), Max: 98.8 (10-13-21 @ 15:15)  HR: 90 (10-14-21 @ 10:00)  BP: 110/76 (10-14-21 @ 10:00)  RR: 15 (10-14-21 @ 10:00)  SpO2: 99% (10-14-21 @ 10:00)  Wt(kg): --    GENERAL: NAD  HEENT: EOMI, MMM, no oropharyngeal lesions or erythema appreciated  Pulm: no inc wob  CV: RRR  ABDOMEN: soft, nt, nd  MSK: nl ROM  EXTREMITIES:  no appreciable edema in b/l LE  Neuro: A&Ox3, no focal deficits  SKIN: surgical site c/d/i,                           9.2    14.31 )-----------( 88       ( 14 Oct 2021 08:23 )             26.6       10-14    140  |  106  |  7   ----------------------------<  93  4.6   |  27  |  0.67    Ca    7.0<L>      14 Oct 2021 08:23  Phos  2.2     10-14  Mg     5.30     10-14    TPro  5.7<L>  /  Alb  3.4  /  TBili  0.8  /  DBili  x   /  AST  212<H>  /  ALT  207<H>  /  AlkPhos  118  10-14      Lactate Dehydrogenase, Serum: 779 U/L (10-14 @ 08:23)  Magnesium, Serum: 5.30 mg/dL (10-14 @ 08:23)  Phosphorus Level, Serum: 2.2 mg/dL (10-14 @ 08:23)  Magnesium, Serum: 7.9 mg/dL (10-14 @ 03:10)  Phosphorus Level, Serum: 3.2 mg/dL (10-14 @ 03:10)  Lactate Dehydrogenase, Serum: 793 U/L (10-14 @ 03:10)  Lactate Dehydrogenase, Serum: 790 U/L (10-13 @ 20:03)  Magnesium, Serum: 6.60 mg/dL (10-13 @ 20:03)  Uric Acid, Serum: 5.9 mg/dL (10-13 @ 20:03)  Uric Acid, Serum: 5.4 mg/dL (10-13 @ 15:46)  Lactate Dehydrogenase, Serum: 767 U/L (10-13 @ 15:46)  Magnesium, Serum: 6.50 mg/dL (10-13 @ 15:46)  Uric Acid, Serum: 5.8 mg/dL (10-13 @ 11:08)  Lactate Dehydrogenase, Serum: 555 U/L (10-13 @ 11:08)  Magnesium, Serum: 5.60 mg/dL (10-13 @ 11:08)       HPI:  31 yo  @ 39.4 wks complaining of contractions increasing since 8-9pm, unable to walk or sit, pt reports no relief of firmness of abdomen in between contractions. denies vb or lof, reports +GFM. AP course uncomplicated thus far. denies fever chills ha new swelling vision changes cp sob or cough. last seen by OB last week, last here in D&T Monday VE cervix closed. EFW 7#15.    Hospital course c/b thrombocytopenia to 70s on admission, downtrended to 40s prior to delivery. Elevated AST/ALT to 300s, Hg 11 -> 7, w/ , concerning for HELLP vs severe pre-ecalampsia. Pt underwent C/S, w/o complication, transfused 1u pRBC and 2u platelets. Found to be hypertensive as well, started on Mg, currently on Mg gtt. Hematology c/s overnight for concern for HELLP.     Pt seen this morning. Reports no acute complaints, mild cramping abd pain. Denies fevers, chills, chest p/p, sob, n/v/d skin changes, headache.     GBS: negative  meds: PNV  All: denies  PMH: denies  PSH: tonsillectomy   gyn hx: denies  ob hx: denies (13 Oct 2021 06:30)      Allergies    No Known Allergies    Intolerances        MEDICATIONS  (STANDING):  diphtheria/tetanus/pertussis (acellular) Vaccine (ADAcel) 0.5 milliLiter(s) IntraMuscular once  heparin   Injectable 5000 Unit(s) SubCutaneous every 8 hours  magnesium sulfate Infusion 1 Gm/Hr (25 mL/Hr) IV Continuous <Continuous>    MEDICATIONS  (PRN):  diphenhydrAMINE 25 milliGRAM(s) Oral every 6 hours PRN Pruritus  lanolin Ointment 1 Application(s) Topical every 6 hours PRN Sore Nipples  magnesium hydroxide Suspension 30 milliLiter(s) Oral two times a day PRN Constipation  oxyCODONE    IR 5 milliGRAM(s) Oral every 3 hours PRN Moderate to Severe Pain (4-10)  oxyCODONE    IR 5 milliGRAM(s) Oral once PRN Moderate to Severe Pain (4-10)  simethicone 80 milliGRAM(s) Chew every 4 hours PRN Gas      PAST MEDICAL & SURGICAL HISTORY:  No pertinent past medical history    S/P tonsillectomy          FAMILY HISTORY:      SOCIAL HISTORY: No EtOH, no tobacco    REVIEW OF SYSTEMS:    CONSTITUTIONAL: no fever  EYES/ENT: No visual changes;  no throat pain   NECK: No pain or stiffness  RESPIRATORY: no sob  CARDIOVASCULAR: No chest pain or palpitations  GASTROINTESTINAL: +mild abd cramping, No epigastric pain. No NV/D/C  GENITOURINARY: No dysuria, change in frequency or hematuria  NEUROLOGICAL: No numbness or weakness  SKIN: No itching, burning, rashes, or lesions   Psych: No depression   MSK: no joint pain  Allergy: no urticaria         T(F): 97.8 (10-14-21 @ 08:00), Max: 98.8 (10-13-21 @ 15:15)  HR: 90 (10-14-21 @ 10:00)  BP: 110/76 (10-14-21 @ 10:00)  RR: 15 (10-14-21 @ 10:00)  SpO2: 99% (10-14-21 @ 10:00)  Wt(kg): --    GENERAL: NAD  HEENT: EOMI, MMM, no oropharyngeal lesions or erythema appreciated  Pulm: no inc wob  CV: RRR  ABDOMEN: soft, nt, nd  MSK: nl ROM  EXTREMITIES:  no appreciable edema in b/l LE  Neuro: A&Ox3, no focal deficits  SKIN: surgical site c/d/i,                           9.2    14.31 )-----------( 88       ( 14 Oct 2021 08:23 )             26.6       10-14    140  |  106  |  7   ----------------------------<  93  4.6   |  27  |  0.67    Ca    7.0<L>      14 Oct 2021 08:23  Phos  2.2     10-14  Mg     5.30     10-14    TPro  5.7<L>  /  Alb  3.4  /  TBili  0.8  /  DBili  x   /  AST  212<H>  /  ALT  207<H>  /  AlkPhos  118  10-14      Lactate Dehydrogenase, Serum: 779 U/L (10-14 @ 08:23)  Magnesium, Serum: 5.30 mg/dL (10-14 @ 08:23)  Phosphorus Level, Serum: 2.2 mg/dL (10-14 @ 08:23)  Magnesium, Serum: 7.9 mg/dL (10-14 @ 03:10)  Phosphorus Level, Serum: 3.2 mg/dL (10-14 @ 03:10)  Lactate Dehydrogenase, Serum: 793 U/L (10-14 @ 03:10)  Lactate Dehydrogenase, Serum: 790 U/L (10-13 @ 20:03)  Magnesium, Serum: 6.60 mg/dL (10-13 @ 20:03)  Uric Acid, Serum: 5.9 mg/dL (10-13 @ 20:03)  Uric Acid, Serum: 5.4 mg/dL (10-13 @ 15:46)  Lactate Dehydrogenase, Serum: 767 U/L (10-13 @ 15:46)  Magnesium, Serum: 6.50 mg/dL (10-13 @ 15:46)  Uric Acid, Serum: 5.8 mg/dL (10-13 @ 11:08)  Lactate Dehydrogenase, Serum: 555 U/L (10-13 @ 11:08)  Magnesium, Serum: 5.60 mg/dL (10-13 @ 11:08)

## 2021-10-14 NOTE — PROGRESS NOTE ADULT - SUBJECTIVE AND OBJECTIVE BOX
R3 Antepartum Note, HD#3 POD#1    Interval events: Patient seen and examined at bedside, no acute overnight events pt reporting resolution of abdominal pain and nausea prior to c/s. Overnight Mg elevated to 7.9 so held at 0430 - to be restarted this morning @ rate of 1. No acute complaints. Pain well controlled     Vital Signs Last 24 Hours  T(C): 35.5 (10-14-21 @ 04:00), Max: 37.1 (10-13-21 @ 15:15)  HR: 67 (10-14-21 @ 07:00) (67 - 137)  BP: 110/69 (10-14-21 @ 07:00) (59/41 - 154/82)  RR: 9 (10-14-21 @ 07:00) (9 - 26)  SpO2: 99% (10-14-21 @ 07:00) (98% - 100%)    CAPILLARY BLOOD GLUCOSE      Physical Exam:  General: NAD  Abdomen: Soft, minimally nontender,, softly distended   Incision: C/D/I   Ext: No pain or swelling    Labs:             8.2    12.61 )-----------( 79       ( 10-14 @ 03:10 )             24.0     10-14 @ 03:10    134  |  101  |  8   ----------------------------<  181  3.9   |  22  |  0.81    Ca    6.1      10-14 @ 03:10  Phos  3.2     10-14 @ 03:10  Mg     7.9     10-14 @ 03:10    TPro  5.0  /  Alb  3.1  /  TBili  0.8  /  DBili  x   /  AST  250  /  ALT  202  /  AlkPhos  110  10-14 @ 03:10    PT/INR - ( 10-14 @ 03:10 )   PT: 12.4 sec;   INR: 1.08 ratio    PTT - ( 10-14 @ 03:10 )  PTT:25.1 sec    Uric Acid: (10-14 @ 03:10)  --       Fibrinogen: (10-14 @ 03:10)  421      LDH: (10-14 @ 03:10)  793        MEDICATIONS  (STANDING):  acetaminophen   Tablet .. 975 milliGRAM(s) Oral once  diphtheria/tetanus/pertussis (acellular) Vaccine (ADAcel) 0.5 milliLiter(s) IntraMuscular once  magnesium sulfate Infusion 1 Gm/Hr (25 mL/Hr) IV Continuous <Continuous>  NIFEdipine XL 30 milliGRAM(s) Oral daily  oxytocin Infusion 16.667 milliUNIT(s)/Min (50 mL/Hr) IV Continuous <Continuous>    MEDICATIONS  (PRN):  diphenhydrAMINE 25 milliGRAM(s) Oral every 6 hours PRN Pruritus  lanolin Ointment 1 Application(s) Topical every 6 hours PRN Sore Nipples  magnesium hydroxide Suspension 30 milliLiter(s) Oral two times a day PRN Constipation  oxyCODONE    IR 5 milliGRAM(s) Oral every 3 hours PRN Moderate to Severe Pain (4-10)  oxyCODONE    IR 5 milliGRAM(s) Oral once PRN Moderate to Severe Pain (4-10)  simethicone 80 milliGRAM(s) Chew every 4 hours PRN Gas   R3 Antepartum Note, HD#3 POD#1    Interval events: Patient seen and examined at bedside, no acute overnight events pt reporting resolution of abdominal pain and nausea prior to c/s. Overnight Mg elevated to 7.9 so held at 0430 - to be restarted this morning @ rate of 1. No acute complaints. Pain well controlled     Vital Signs Last 24 Hours  T(C): 35.5 (10-14-21 @ 04:00), Max: 37.1 (10-13-21 @ 15:15)  HR: 67 (10-14-21 @ 07:00) (67 - 137)  BP: 110/69 (10-14-21 @ 07:00) (59/41 - 154/82)  RR: 9 (10-14-21 @ 07:00) (9 - 26)  SpO2: 99% (10-14-21 @ 07:00) (98% - 100%)      Physical Exam:  General: NAD  Abdomen: Soft, minimally nontender,, softly distended   Incision: C/D/I   Ext: No pain or swelling    Labs:             8.2    12.61 )-----------( 79       ( 10-14 @ 03:10 )             24.0     10-14 @ 03:10    134  |  101  |  8   ----------------------------<  181  3.9   |  22  |  0.81    Ca    6.1      10-14 @ 03:10  Phos  3.2     10-14 @ 03:10  Mg     7.9     10-14 @ 03:10    TPro  5.0  /  Alb  3.1  /  TBili  0.8  /  DBili  x   /  AST  250  /  ALT  202  /  AlkPhos  110  10-14 @ 03:10    PT/INR - ( 10-14 @ 03:10 )   PT: 12.4 sec;   INR: 1.08 ratio    PTT - ( 10-14 @ 03:10 )  PTT:25.1 sec    Uric Acid: (10-14 @ 03:10)  --       Fibrinogen: (10-14 @ 03:10)  421      LDH: (10-14 @ 03:10)  793        MEDICATIONS  (STANDING):  acetaminophen   Tablet .. 975 milliGRAM(s) Oral once  diphtheria/tetanus/pertussis (acellular) Vaccine (ADAcel) 0.5 milliLiter(s) IntraMuscular once  magnesium sulfate Infusion 1 Gm/Hr (25 mL/Hr) IV Continuous <Continuous>  NIFEdipine XL 30 milliGRAM(s) Oral daily  oxytocin Infusion 16.667 milliUNIT(s)/Min (50 mL/Hr) IV Continuous <Continuous>    MEDICATIONS  (PRN):  diphenhydrAMINE 25 milliGRAM(s) Oral every 6 hours PRN Pruritus  lanolin Ointment 1 Application(s) Topical every 6 hours PRN Sore Nipples  magnesium hydroxide Suspension 30 milliLiter(s) Oral two times a day PRN Constipation  oxyCODONE    IR 5 milliGRAM(s) Oral every 3 hours PRN Moderate to Severe Pain (4-10)  oxyCODONE    IR 5 milliGRAM(s) Oral once PRN Moderate to Severe Pain (4-10)  simethicone 80 milliGRAM(s) Chew every 4 hours PRN Gas

## 2021-10-15 LAB
ALBUMIN SERPL ELPH-MCNC: 3 G/DL — LOW (ref 3.3–5)
ALP SERPL-CCNC: 114 U/L — SIGNIFICANT CHANGE UP (ref 40–120)
ALT FLD-CCNC: 163 U/L — HIGH (ref 4–33)
ANION GAP SERPL CALC-SCNC: 8 MMOL/L — SIGNIFICANT CHANGE UP (ref 7–14)
APTT BLD: 30.7 SEC — SIGNIFICANT CHANGE UP (ref 27–36.3)
AST SERPL-CCNC: 116 U/L — HIGH (ref 4–32)
BILIRUB SERPL-MCNC: 0.4 MG/DL — SIGNIFICANT CHANGE UP (ref 0.2–1.2)
BUN SERPL-MCNC: 11 MG/DL — SIGNIFICANT CHANGE UP (ref 7–23)
CALCIUM SERPL-MCNC: 7.9 MG/DL — LOW (ref 8.4–10.5)
CHLORIDE SERPL-SCNC: 102 MMOL/L — SIGNIFICANT CHANGE UP (ref 98–107)
CO2 SERPL-SCNC: 26 MMOL/L — SIGNIFICANT CHANGE UP (ref 22–31)
CREAT SERPL-MCNC: 0.72 MG/DL — SIGNIFICANT CHANGE UP (ref 0.5–1.3)
FIBRINOGEN PPP-MCNC: 581 MG/DL — HIGH (ref 290–520)
GLUCOSE SERPL-MCNC: 102 MG/DL — HIGH (ref 70–99)
HCT VFR BLD CALC: 28.2 % — LOW (ref 34.5–45)
HGB BLD-MCNC: 9.7 G/DL — LOW (ref 11.5–15.5)
INR BLD: 0.94 RATIO — SIGNIFICANT CHANGE UP (ref 0.88–1.16)
LDH SERPL L TO P-CCNC: 644 U/L — HIGH (ref 135–225)
MAGNESIUM SERPL-MCNC: 3.2 MG/DL — HIGH (ref 1.6–2.6)
MCHC RBC-ENTMCNC: 29.6 PG — SIGNIFICANT CHANGE UP (ref 27–34)
MCHC RBC-ENTMCNC: 34.4 GM/DL — SIGNIFICANT CHANGE UP (ref 32–36)
MCV RBC AUTO: 86 FL — SIGNIFICANT CHANGE UP (ref 80–100)
NRBC # BLD: 0 /100 WBCS — SIGNIFICANT CHANGE UP
NRBC # FLD: 0.02 K/UL — HIGH
PLATELET # BLD AUTO: 99 K/UL — LOW (ref 150–400)
POTASSIUM SERPL-MCNC: 4.4 MMOL/L — SIGNIFICANT CHANGE UP (ref 3.5–5.3)
POTASSIUM SERPL-SCNC: 4.4 MMOL/L — SIGNIFICANT CHANGE UP (ref 3.5–5.3)
PROT SERPL-MCNC: 5.5 G/DL — LOW (ref 6–8.3)
PROTHROM AB SERPL-ACNC: 10.8 SEC — SIGNIFICANT CHANGE UP (ref 10.6–13.6)
RBC # BLD: 3.28 M/UL — LOW (ref 3.8–5.2)
RBC # FLD: 14.8 % — HIGH (ref 10.3–14.5)
SARS-COV-2 RNA SPEC QL NAA+PROBE: SIGNIFICANT CHANGE UP
SODIUM SERPL-SCNC: 136 MMOL/L — SIGNIFICANT CHANGE UP (ref 135–145)
WBC # BLD: 14.46 K/UL — HIGH (ref 3.8–10.5)
WBC # FLD AUTO: 14.46 K/UL — HIGH (ref 3.8–10.5)

## 2021-10-15 PROCEDURE — 99232 SBSQ HOSP IP/OBS MODERATE 35: CPT

## 2021-10-15 PROCEDURE — 99291 CRITICAL CARE FIRST HOUR: CPT

## 2021-10-15 RX ORDER — OXYCODONE HYDROCHLORIDE 5 MG/1
5 TABLET ORAL
Refills: 0 | Status: DISCONTINUED | OUTPATIENT
Start: 2021-10-15 | End: 2021-10-17

## 2021-10-15 RX ORDER — IBUPROFEN 200 MG
600 TABLET ORAL EVERY 6 HOURS
Refills: 0 | Status: COMPLETED | OUTPATIENT
Start: 2021-10-15 | End: 2021-10-15

## 2021-10-15 RX ORDER — HEPARIN SODIUM 5000 [USP'U]/ML
5000 INJECTION INTRAVENOUS; SUBCUTANEOUS EVERY 12 HOURS
Refills: 0 | Status: DISCONTINUED | OUTPATIENT
Start: 2021-10-15 | End: 2021-10-17

## 2021-10-15 RX ADMIN — SIMETHICONE 80 MILLIGRAM(S): 80 TABLET, CHEWABLE ORAL at 16:11

## 2021-10-15 RX ADMIN — SIMETHICONE 80 MILLIGRAM(S): 80 TABLET, CHEWABLE ORAL at 22:01

## 2021-10-15 RX ADMIN — Medication 600 MILLIGRAM(S): at 06:30

## 2021-10-15 RX ADMIN — Medication 600 MILLIGRAM(S): at 11:30

## 2021-10-15 RX ADMIN — Medication 600 MILLIGRAM(S): at 11:43

## 2021-10-15 RX ADMIN — MAGNESIUM HYDROXIDE 30 MILLILITER(S): 400 TABLET, CHEWABLE ORAL at 13:42

## 2021-10-15 RX ADMIN — Medication 600 MILLIGRAM(S): at 05:30

## 2021-10-15 RX ADMIN — HEPARIN SODIUM 5000 UNIT(S): 5000 INJECTION INTRAVENOUS; SUBCUTANEOUS at 05:30

## 2021-10-15 RX ADMIN — Medication 600 MILLIGRAM(S): at 16:11

## 2021-10-15 RX ADMIN — SIMETHICONE 80 MILLIGRAM(S): 80 TABLET, CHEWABLE ORAL at 05:29

## 2021-10-15 RX ADMIN — Medication 600 MILLIGRAM(S): at 22:45

## 2021-10-15 RX ADMIN — SIMETHICONE 80 MILLIGRAM(S): 80 TABLET, CHEWABLE ORAL at 00:42

## 2021-10-15 RX ADMIN — Medication 600 MILLIGRAM(S): at 22:01

## 2021-10-15 RX ADMIN — HEPARIN SODIUM 5000 UNIT(S): 5000 INJECTION INTRAVENOUS; SUBCUTANEOUS at 19:15

## 2021-10-15 RX ADMIN — SIMETHICONE 80 MILLIGRAM(S): 80 TABLET, CHEWABLE ORAL at 11:25

## 2021-10-15 NOTE — PROGRESS NOTE ADULT - SUBJECTIVE AND OBJECTIVE BOX
R3 Antepartum Note, HD#4 POD#2    Interval events: Patient seen and examined at bedside, no acute overnight events. Pt reporting good pain control and denies any n/v SOB, CP, but has not passed flatus/BM and is feeling increasing abdominal distension/discomfort. Of note pt took multiple doses of oxycodone only for pain control 10/14. concerned about disposition, PCR on admission (-) for COVID and abs (-) on admission, but now with (+) COVID abs so transfer to postpartum floor held 10/14. Per primary team, plan for reswab today     Vital Signs Last 24 Hours  T(C): 36.6 (10-15-21 @ 04:00), Max: 37 (10-15-21 @ 00:00)  HR: 75 (10-15-21 @ 07:00) (69 - 104)  BP: 124/93 (10-15-21 @ 07:00) (107/86 - 148/79)  RR: 14 (10-15-21 @ 07:00) (12 - 21)  SpO2: 99% (10-15-21 @ 07:00) (98% - 100%)    Physical Exam:  General: NAD  Abdomen: Soft, minimally nontender, distended & tympanic, faint BS   Incision: C/D/I   Ext: No pain or swelling    Labs:             9.7    14.46 )-----------( 99       ( 10-15 @ 02:39 )             28.2     10-15 @ 02:39    136  |  102  |  11  ----------------------------<  102  4.4   |  26  |  0.72    Ca    7.9      10-15 @ 02:39  Phos  2.2     10-14 @ 08:23  Mg     3.20     10-15 @ 02:39    TPro  5.5  /  Alb  3.0  /  TBili  0.4  /  DBili  x   /  AST  116  /  ALT  163  /  AlkPhos  114  10-15 @ 02:39    PT/INR - ( 10-15 @ 02:39 )   PT: 10.8 sec;   INR: 0.94 ratio    PTT - ( 10-15 @ 02:39 )  PTT:30.7 sec    Uric Acid: (10-15 @ 02:39)  --       Fibrinogen: (10-15 @ 02:39)  581      LDH: (10-15 @ 02:39)  644        MEDICATIONS  (STANDING):  diphtheria/tetanus/pertussis (acellular) Vaccine (ADAcel) 0.5 milliLiter(s) IntraMuscular once  heparin   Injectable 5000 Unit(s) SubCutaneous every 8 hours  ibuprofen  Tablet. 600 milliGRAM(s) Oral every 6 hours    MEDICATIONS  (PRN):  diphenhydrAMINE 25 milliGRAM(s) Oral every 6 hours PRN Pruritus  lanolin Ointment 1 Application(s) Topical every 6 hours PRN Sore Nipples  magnesium hydroxide Suspension 30 milliLiter(s) Oral two times a day PRN Constipation  oxyCODONE    IR 5 milliGRAM(s) Oral once PRN Moderate to Severe Pain (4-10)  oxyCODONE    IR 5 milliGRAM(s) Oral every 3 hours PRN Moderate to Severe Pain (4-10)  simethicone 80 milliGRAM(s) Chew every 4 hours PRN Gas

## 2021-10-15 NOTE — PROGRESS NOTE ADULT - SUBJECTIVE AND OBJECTIVE BOX
SICU Daily Progress Note  ----------------------------------------------------------------------------      24 HOUR EVENTS:   - howard out, voided  -SQh started  - diet started    HPI:  31 yo w/ no significant PMH, currently  s/p spontaneous vaginal delivery at 39 wks following induction for severe pre-eclampsia/HELLP syndrome tonight (10/13). Pt w/ symptoms of N/V progressing to development of LUQ abd pain today (10/13), prompting her admission to L&D. Initial labs included thrombocytopenia, transaminitis, and anemia, raising concern for pre-ecclampsia/HELLP syndrome, for which the pt was ultimately induced. Following delivery pt remained hemodynamically stable, however repeat labs demonstrated worsening lab abnormalities, for which the SICU was consulted for hemodynamic monitoring.    Gen: A&Ox4, NAD  HEENT: Atraumatic. Mucous membranes moist, no scleral icterus. Pale conjunctiva  CV: RRR . No murmurs.  1+ bilat LE edema.  Distal pulses 2+. Capillary refill < 2 seconds.  Resp: Respirations unlabored. CTAB, no rales, rhonchi, or wheezes.  GI: soft, appropriately tender to palpation, fundus firm below level of umbilicus, + BS, incision c/d/i  Skin/MSK: No open wounds. No ecchymosis appreciated. No rashes.  Neuro: Following commands. Moving extremities spontaneously. Hyperreflexia of brachial, brachioradialis b/l.  Psych: Appropriate mood, cooperative    GBS: negative  meds: PNV  All: denies  PMH: denies  PSH: tonsillectomy   gyn hx: denies  ob hx: denies (13 Oct 2021 06:30)    NEURO    RESPIRATORY  RR: 15 (10-14-21 @ 02:00) (12 - 26)  SpO2: 98% (10-14-21 @ 02:00) (98% - 100%)  Wt(kg): --  Mechanical Ventilation:       CARDIOVASCULAR  HR: 79 (10-14-21 @ 02:00) (70 - 137)  BP: 108/70 (10-14-21 @ 02:00) (59/41 - 168/80)  BP(mean): 78 (10-14-21 @ 02:00) (35 - 141)    GI/NUTRITION  Diet: Reg    GENITOURINARY  I&O's Detail    I&O's Detail    13 Oct 2021 07:  -  14 Oct 2021 07:00  --------------------------------------------------------  IN:    IV PiggyBack: 200 mL    IV PiggyBack: 2000 mL    Lactated Ringers: 150 mL    Lactated Ringers: 300 mL    Lactated Ringers: 375 mL    Lactated Ringers Bolus: 500 mL    Magnesium Sulfate: 725 mL    Magnesium Sulfate: 300 mL    Oral Fluid: 200 mL    Oxytocin: 300 mL    Platelets - Single Donor: 555 mL    PRBCs (Packed Red Blood Cells): 300 mL  Total IN: 5905 mL    OUT:    Blood Loss (mL): 1100 mL    Indwelling Catheter - Urethral (mL): 4920 mL  Total OUT: 6020 mL    Total NET: -115 mL      14 Oct 2021 07:  -  14 Oct 2021 11:43  --------------------------------------------------------  IN:    Magnesium Sulfate: 55 mL  Total IN: 55 mL    OUT:    Indwelling Catheter - Urethral (mL): 460 mL  Total OUT: 460 mL    Total NET: -405 mL    Weight (kg): 64 (10-13 @ 07:05)  10-13    136  |  104  |  9   ----------------------------<  153<H>  4.1   |  17<L>  |  0.87    Ca    6.5<LL>      13 Oct 2021 20:03  Mg     6.60     10-13    TPro  5.1<L>  /  Alb  3.0<L>  /  TBili  0.8  /  DBili  x   /  AST  332<H>  /  ALT  248<H>  /  AlkPhos  118  10-13      HEMATOLOGIC                        8.2    12.61 )-----------( 79       ( 14 Oct 2021 03:10 )             24.0     PT/INR - ( 14 Oct 2021 03:10 )   PT: 12.4 sec;   INR: 1.08 ratio    PTT - ( 14 Oct 2021 03:10 )  PTT:25.1 sec       SICU Daily Progress Note  ----------------------------------------------------------------------------  24 HOUR EVENTS:   - howard out, voided  - SQH started  - diet started  - PCEA removed at 830pm    HPI: 31 yo w/ no significant PMH, currently  s/p spontaneous vaginal delivery at 39 wks following induction for severe pre-eclampsia/HELLP syndrome tonight (10/13). Pt w/ symptoms of N/V progressing to development of LUQ abd pain today (10/13), prompting her admission to L&D. Initial labs included thrombocytopenia, transaminitis, and anemia, raising concern for pre-ecclampsia/HELLP syndrome, for which the pt was ultimately induced. Following delivery pt remained hemodynamically stable, however repeat labs demonstrated worsening lab abnormalities, for which the SICU was consulted for hemodynamic monitoring.    MEDICATIONS  (STANDING):  diphtheria/tetanus/pertussis (acellular) Vaccine (ADAcel) 0.5 milliLiter(s) IntraMuscular once  heparin   Injectable 5000 Unit(s) SubCutaneous every 8 hours    MEDICATIONS  (PRN):  diphenhydrAMINE 25 milliGRAM(s) Oral every 6 hours PRN Pruritus  lanolin Ointment 1 Application(s) Topical every 6 hours PRN Sore Nipples  magnesium hydroxide Suspension 30 milliLiter(s) Oral two times a day PRN Constipation  oxyCODONE    IR 5 milliGRAM(s) Oral once PRN Moderate to Severe Pain (4-10)  oxyCODONE    IR 5 milliGRAM(s) Oral every 3 hours PRN Moderate to Severe Pain (4-10)  simethicone 80 milliGRAM(s) Chew every 4 hours PRN Gas    ICU Vital Signs Last 24 Hrs  T(C): 36.9 (14 Oct 2021 20:00), Max: 36.9 (14 Oct 2021 20:00)  T(F): 98.5 (14 Oct 2021 20:00), Max: 98.5 (14 Oct 2021 20:00)  HR: 78 (15 Oct 2021 00:00) (67 - 104)  BP: 116/70 (15 Oct 2021 00:00) (102/64 - 148/79)  BP(mean): 80 (15 Oct 2021 00:00) (73 - 98)  ABP: --  ABP(mean): --  RR: 15 (15 Oct 2021 00:00) (9 - 23)  SpO2: 99% (15 Oct 2021 00:00) (98% - 100%)    I&O's Detail    13 Oct 2021 07:01  -  14 Oct 2021 07:00  --------------------------------------------------------  IN:    IV PiggyBack: 200 mL    IV PiggyBack: 2000 mL    Lactated Ringers: 150 mL    Lactated Ringers: 300 mL    Lactated Ringers: 375 mL    Lactated Ringers Bolus: 500 mL    Magnesium Sulfate: 725 mL    Magnesium Sulfate: 300 mL    Oral Fluid: 200 mL    Oxytocin: 300 mL    Platelets - Single Donor: 555 mL    PRBCs (Packed Red Blood Cells): 300 mL  Total IN: 5905 mL    OUT:    Blood Loss (mL): 1100 mL    Indwelling Catheter - Urethral (mL): 4920 mL  Total OUT: 6020 mL    Total NET: -115 mL      14 Oct 2021 07:01  -  15 Oct 2021 00:37  --------------------------------------------------------  IN:    Magnesium Sulfate: 105 mL    Oral Fluid: 870 mL  Total IN: 975 mL    OUT:    Indwelling Catheter - Urethral (mL): 550 mL    Voided (mL): 750 mL  Total OUT: 1300 mL    Total NET: -325 mL    NEURO  A&Ox3    RESP  CTA B/L    CARDIO  S1, S2, NSR    GI:  Abdomen, soft, appropriately tender, distended, tolerating diet

## 2021-10-15 NOTE — PROGRESS NOTE ADULT - ASSESSMENT
ASSESSMENT:  31 yo w/ no significant PMH, P1 s/p pLTCS at 39 wks for severe pre-eclampsia/HELLP syndrome (10/13). Pt presented with symptoms of N/V progressing to development of LUQ abd pain today (10/13), prompting her admission to L&D. Initial labs included thrombocytopenia, transaminitis, and anemia, raising concern for pre-ecclampsia/HELLP syndrome. IOL was initiated, however had worsening lab abnormalities and was remote from delivery, and  section was recommended. Following delivery pt remained hemodynamically stable, however SICU was consulted for hemodynamic monitoring in the setting of worsening lab abnormalities.    PLAN:    NEURO/PAIN:  - A&Ox4  - denies HA/vision changes  - Magnesium gtt for seizure prophylaxis 24h postpartum - goal serum mag 4-7 mg/d, elevated to 7.9 overnight, so held and restarted at rate of 1 this morning  - PO Tylenol, Oxycodone PRN for post partum pain managment - hold motrin in setting of thrombocytopenia    RESPIRATORY:   - monitor respiratory status, pulmonary edema  - continuous pulse ox    CARDIOVASCULAR:  - monitor VS and perfusion status  - procardia 30 mg daily in setting of concern for pre-eclampsia - held today due to BP's in the 110s/60s    GI/NUTRITION:  - transaminitis downtrending from 332/248 to 212/207, continue to monitor q6  - asymptomatic, denies abdominal pain  - Regular diet  - mylicon/mag hydroxide PRN for constipation    GENITOURINARY/RENAL:  - Howard in place, to be d/c'd today  - monitor UOP/Cr  - monitor electrolytes    HEMATOLOGIC:  - H/H stabilizing 10.2>8.3>7.6>8.2>9.2; LDH   elevated, 1-3 schistocytes seen on smear  - s/p 2u plts and 1u pRBC, H/h now appropriate with QBL 1100  - heme consulted; goal plt >20,000; goal hgb >7  - SCDs  - Heparin 5000 q8 for DVT ppx  - q6 HELLP labs; trend PT/PTT/fibrinogen/LDH    INFECTIOUS DISEASE:  - no active issues  - monitor for fevers; trend WBC    ENDOCRINE:  - no active issues    Lines:  PIV x 2  howard  PCEA in place, non functional, to be removed by anesthesia when thrombocytopenia improves    GOC/CODE STATUS:  Full Code    DISPO: SICU     ASSESSMENT:  29 yo w/ no significant PMH, P1 s/p pLTCS at 39 wks for severe pre-eclampsia/HELLP syndrome (10/13). Pt presented with symptoms of N/V progressing to development of LUQ abd pain today (10/13), prompting her admission to L&D. Initial labs included thrombocytopenia, transaminitis, and anemia, raising concern for pre-ecclampsia/HELLP syndrome. IOL was initiated, however had worsening lab abnormalities and was remote from delivery, and  section was recommended. Following delivery pt remained hemodynamically stable, however SICU was consulted for hemodynamic monitoring in the setting of worsening lab abnormalities.    PLAN:    NEURO/PAIN:  - A&Ox4  - PCEA removed 10/14/21 830pm  - denies HA/vision changes  - Magnesium gtt for seizure prophylaxis 24h postpartum - goal serum mag 4-7 mg/d, elevated to 7.9 overnight, so held and restarted at rate of 1 this morning  - PO Tylenol, Oxycodone PRN for post partum pain managment - hold motrin in setting of thrombocytopenia    RESPIRATORY:   - monitor respiratory status, pulmonary edema  - continuous pulse ox    CARDIOVASCULAR:  - monitor VS and perfusion status  - procardia 30 mg daily in setting of concern for pre-eclampsia now d/c'ed    GI/NUTRITION:  - transaminitis downtrending from 332/248 to 212/207, continue to monitor q6  - asymptomatic, denies abdominal pain  - tolerating egular diet  - mylicon/mag hydroxide PRN for constipation    GENITOURINARY/RENAL:  - Arreaga now d/c'd and voiding  - monitor UOP/Cr  - monitor electrolytes    HEMATOLOGIC:  - H/H stabilizing 10.2>8.3>7.6>8.2>9.2; LDH   elevated, 1-3 schistocytes seen on smear  - s/p 3u plts and 1u pRBC, H/h now appropriate with QBL 1100  - heme consulted; goal plt >20,000; goal hgb >7  - SCDs  - Heparin 5000 q8 for DVT ppx  - q6 HELLP labs; trend PT/PTT/fibrinogen/LDH    INFECTIOUS DISEASE:  - no active issues  - monitor for fevers; trend WBC    ENDOCRINE:  - no active issues    Lines:  PIV x 2    GOC/CODE STATUS:  Full Code    DISPO: SICU

## 2021-10-15 NOTE — CHART NOTE - NSCHARTNOTEFT_GEN_A_CORE
R3 OB Overnight Note    Patient seen and evaluated at bedside in SICU. Patient states she overall feels well. Pain is well controlled with Oxy. She has started ambulating. Denies heavy vaginal bleeding. Endorses gas pain. Has not yet passed flatus or had bowel movements. Recently took first dose of simethicone to help with gas.     Vital Signs Last 24 Hrs  T(C): 37 (15 Oct 2021 00:00), Max: 37 (15 Oct 2021 00:00)  T(F): 98.6 (15 Oct 2021 00:00), Max: 98.6 (15 Oct 2021 00:00)  HR: 78 (15 Oct 2021 00:00) (67 - 104)  BP: 116/70 (15 Oct 2021 00:00) (102/64 - 148/79)  BP(mean): 80 (15 Oct 2021 00:00) (73 - 98)  RR: 15 (15 Oct 2021 00:00) (9 - 21)  SpO2: 99% (15 Oct 2021 00:00) (98% - 100%)    Gen: in NAD, resting comfortably in bed  Abd: soft, nontender, distended - air filled, tympanic. Pfannenstiel c/d/i  Ext: no RIAN    A/P: 31yo P1 POD2 s/p pLTCS for HELLP syndrome with marked lab abnormalities, now s/p transfusion of 2u pRBC/2u PLT/2u FFP. Patient is well appearing, but continues to have distended, tympanic abdomen without passage of flatus or BM, c/w probably ileus.     -recommend starting standing Tylenol and/or Motrin for baseline pain control; Oxy PRN for breakthrough, as oxy likely contributing to ileus  -C/w Simethicone  -Recommend increasing ambulation, chewing gum, and warm tea to help with flatus  -Labs per SICU  -Appreciate excellent SICU care    Please call OB with any questions or concerns, spectra #[id number]  RFrankel PGY3 R3 OB Overnight Note    Patient seen and evaluated at bedside in SICU. Patient states she overall feels well. Pain is well controlled with Oxy. She has started ambulating. Denies heavy vaginal bleeding. Endorses gas pain. Has not yet passed flatus or had bowel movements. Denies nausea or vomiting. Is tolerating PO intake. Recently took first dose of simethicone to help with gas.     Vital Signs Last 24 Hrs  T(C): 37 (15 Oct 2021 00:00), Max: 37 (15 Oct 2021 00:00)  T(F): 98.6 (15 Oct 2021 00:00), Max: 98.6 (15 Oct 2021 00:00)  HR: 78 (15 Oct 2021 00:00) (67 - 104)  BP: 116/70 (15 Oct 2021 00:00) (102/64 - 148/79)  BP(mean): 80 (15 Oct 2021 00:00) (73 - 98)  RR: 15 (15 Oct 2021 00:00) (9 - 21)  SpO2: 99% (15 Oct 2021 00:00) (98% - 100%)    Gen: in NAD, resting comfortably in bed  Abd: soft, nontender, distended - air filled, tympanic. Pfannenstiel c/d/i  Ext: no RIAN    A/P: 31yo P1 POD2 s/p pLTCS for HELLP syndrome with marked lab abnormalities, now s/p transfusion of 2u pRBC/2u PLT/2u FFP. Patient is well appearing, but continues to have distended, tympanic abdomen without passage of flatus or BM, c/w probably ileus.     -recommend starting standing Tylenol and/or Motrin for baseline pain control; Oxy PRN for breakthrough, as oxy likely contributing to ileus  -C/w Simethicone  -Recommend increasing ambulation, chewing gum, and warm tea to help with flatus  -Labs per SICU  -Appreciate excellent SICU care    Please call OB with any questions or concerns, spectra #[phone]  RFrankel PGY3

## 2021-10-15 NOTE — PROGRESS NOTE ADULT - SUBJECTIVE AND OBJECTIVE BOX
Postpartum Note,  Section  She is a  30y woman who is now post-operative day: 2    Subjective:  The patient feels well. has gas pains however is passing flatus  She is ambulating.   She is tolerating regular diet.  She denies nausea and vomiting.  She is voiding.  Her pain is controlled.  She reports normal postpartum bleeding.  She is breastfeeding.  She is formula feeding.    Physical exam:    Vital Signs Last 24 Hrs  T(C): 37.2 (15 Oct 2021 14:03), Max: 37.2 (15 Oct 2021 14:03)  T(F): 99 (15 Oct 2021 14:03), Max: 99 (15 Oct 2021 14:03)  HR: 100 (15 Oct 2021 14:03) (69 - 104)  BP: 134/81 (15 Oct 2021 14:03) (105/67 - 143/83)  BP(mean): 76 (15 Oct 2021 10:00) (76 - 100)  RR: 18 (15 Oct 2021 14:) (12 - 20)  SpO2: 99% (15 Oct 2021 14:03) (98% - 100%)    Gen: NAD  Breast: Soft, nontender, not engorged.  Abdomen: Soft, nontender, no distension , firm uterine fundus at umbilicus.  Incision: Clean, dry, and intact with steri strips  Pelvic: Normal lochia noted  Ext: No calf tenderness    LABS:                        9.7    14.46 )-----------( 99       ( 15 Oct 2021 02:39 )             28.2       Rubella status:     Allergies    No Known Allergies    Intolerances      MEDICATIONS  (STANDING):  diphtheria/tetanus/pertussis (acellular) Vaccine (ADAcel) 0.5 milliLiter(s) IntraMuscular once  heparin   Injectable 5000 Unit(s) SubCutaneous every 8 hours  ibuprofen  Tablet. 600 milliGRAM(s) Oral every 6 hours    MEDICATIONS  (PRN):  diphenhydrAMINE 25 milliGRAM(s) Oral every 6 hours PRN Pruritus  lanolin Ointment 1 Application(s) Topical every 6 hours PRN Sore Nipples  magnesium hydroxide Suspension 30 milliLiter(s) Oral two times a day PRN Constipation  oxyCODONE    IR 5 milliGRAM(s) Oral every 3 hours PRN breakthrough pain  simethicone 80 milliGRAM(s) Chew every 4 hours PRN Gas        Assessment and Plan  POD #2   s/p  section, s/p HELLP   Status improving. platelets increasing  Encourage ambulation.  Continue routine post op care.

## 2021-10-15 NOTE — PROGRESS NOTE ADULT - ATTENDING COMMENTS
This consultation is a high complexity maternal/fetal finding  that increases the complexity of medical decision making. The time was spent obtaining medically appropriate history and physical exam, counseling, educating, reviewing previous notes and test,  coordinating care,  and documenting the progress note within the EMR.  Patient with symptomatic HELLP syndrome, remote from delivery. Patient seen and examined by me.  Leni has had worsening epigastric pain and focal left sided upper abdominal pain for more than 24 hours which did not respond to zofran. She has laboratory evidence of HELLP in addition was started on Procardia for severe range BP. She is sitting up in bed and appears distressed. Bedside ultrasound without evidence of pulmonary edema, intraabdominal fluid or retroplacental hematoma. While  birth increases maternal morbidity patients abdominal pain is concerning for a concealed abruption. She is hemodynamically stable but a long induction runs the risk of worsening signs/symptoms. A primary  is not unreasonable, as long as risk are understood. I recommend platelets prior to incision, blood on hold intraop and TXA. SICU consult is not need at this time. Plan discussed with patient, her  and covering attending. In addition, a huddle with anesthesia, , nursing and all necessary team members occurred.
Patient seen and examined by me. Recovering from Primary , acute blood loss anemia and HELLP. She is hemodynamically stable s/p PRBC, Platelet and FFP. Awaiting COVID PCR results. If positive consider monoclonal antibody.
I agree with the above detailed interval history, physical, and plan, which I have reviewed and edited where appropriate; also agree with notes/assessment and of the team on service.  I have personally examined the patient, reviewed all data.  The plan is specified below:  The patient is in SICU with diagnosis mentioned in the note.    The SICU team has a constant risk benefit analyzes discussion and coordinating care with the primary team, all consultants, House Staff and PA's.  I was physically present for the key portions of the evaluation and management (E/M) service provided.  29 yo with pre-ecclampsia/HELLP syndrome in SICU  NEURO  A&Ox3  RESP  clear  CARDIO  NSR  GI:  Abdomen, soft, appropriately tender, distended, tolerating diet    PLAN:    NEURO/PAIN:  - PO Tylenol, Oxycodone   RESPIRATORY: respiratory abnormality  -continuous pulse ox  CARDIOVASCULAR: HTN  - monitor HTN  GI/NUTRITION:  regular diet  GENITOURINARY/RENAL:  - monitor electrolytes  HEMATOLOGIC: HELP  - Heparin 5000 q8 for DVT ppx  -INFECTIOUS DISEASE:  - no active issues  -ENDOCRINE:  - no active issues    GOC/CODE STATUS:  Full Code    DISPO: SICU
I saw and counseled Ms. Newton and her partner at bedside. Clinically improving.   Agree with Resident and Fellow assessment and plan as above.   Maite Trotter MD
I agree with the detailed interval history, physical, and plan, which I have reviewed and edited where appropriate'; also agree with notes/assessment with my team on service.  I have personally examined the patient.  I was physically present for the key portions of the evaluation and management (E/M) service provided.  I reviewed all the pertinent data.  The patient is a critical care patient with life threatening hemodynamic and metabolic instability in SICU.  The SICU team has a constant risk benefit analyzes discussion and coordinating care with the primary team and all consultants.   The patient is in SICU with the chief complaint and diagnosis mentioned in the note.   The plan will be specified in the note.  31 yo w/ sp  section, preeclampsia in SICU for hemodynamic monitoring   NEURO  no focal deficits  RESPIRATORY  RR: 15   SpO2: 98%   CARDIOVASCULAR  HR: 79   BP: 108/70   GI/NUTRITION  BS sooftly distended    PLAN:    NEURO/PAIN:  seizures  - Magnesium gtt for seizure   - PO Tylenol, Oxycodone   RESPIRATORY:   - continuous pulse ox  CARDIOVASCULAR:  - procardia   GI/NUTRITION:  - Regular diet  GENITOURINARY/RENAL:  - Arreaga d/c'd today  HEMATOLOGIC:  -- Heparin 5000 q8 for DVT ppx  INFECTIOUS DISEASE:  - no active issues  - monitor for fevers; trend WBC  ENDOCRINE:  - no active issues  GOC/CODE STATUS:  Full Code    DISPO: SICU

## 2021-10-15 NOTE — PROGRESS NOTE ADULT - ASSESSMENT
Ms. Newton is a 30y  POD#2 status post  pLTCS for HELLP Syndrome meeting most postop milestones, now with concern for potential new COVID infection.    #Neuro: pain well controlled w/ current regimen, epidural removed, pt encouraged to take motrin prn and oxy for breakthrough pain only   #CV: Hemodynamically stable currently, AM cbc, H/H 9.7/28.2, Plt 99, status post 3plt, 1U pRBCs 10/14, LFTs now still downtrending 116/163 from 332/248   #Pulm: ROS and PE unremarkable, pt encouraged to use incentive spirometer  #ID: Afebrile, ?new COVID infection, PCR on admission (-) for COVID and abs (-) on admission, but now with (+) COVID abs and neighboring COVID (+) pts in ICU. plan to reswab today   #GI: tolerating reg diet w/o n/v. Simethicone and ambulation encouraged to promote flatus   #: UOP adequate, voiding spontaneously   #FEN: SLIV, reg    #Heme: SCDs while in bed, and early ambulation encouraged for DVT prophylaxis    Appreciate excellent LINDA care      Essence Farmer MD  OBGYN PGY3 Ms. Newton is a 30y  POD#2 status post  pLTCS for HELLP Syndrome meeting most postop milestones, now with concern for potential new COVID infection.    #Neuro: pain well controlled w/ current regimen, epidural removed, pt encouraged to take motrin prn and oxy for breakthrough pain only   #CV: Hemodynamically stable currently, AM cbc, H/H 9.7/28.2, Plt 99, status post 3plt, 1U pRBCs 10/14, LFTs now still downtrending 116/163 from 332/248   #Pulm: ROS and PE unremarkable, pt encouraged to use incentive spirometer  #ID: Afebrile, ?new COVID infection, PCR on admission (-) for COVID and abs (-) on admission, but now with (+) COVID abs and neighboring COVID (+) pts in ICU. plan to reswab today   #GI: tolerating reg diet w/o n/v. Simethicone and ambulation encouraged to promote flatus   #: UOP adequate, voiding spontaneously   #FEN: SLIV, reg    #Heme: SCDs while in bed, and early ambulation encouraged for DVT prophylaxis    Appreciate excellent LINDA care      Essence Farmer MD  OBGYN PGY3      MFM Fellow Addendum    POD#2 s/p PCS in setting of sPEC/HELLP w/ profound thrombocytopenia and elevated transaminitis Now VSS. Labs trending back to normal. From our perspective pt clear to be transferred back to post partum floor however floor waiting for repeat covid test. Will continue to follow    Patient seen with Dr. Clark (MFM attending)    Roldan Rendon M.D. PGY-5  Maternal Fetal Medicine Fellow

## 2021-10-16 LAB
ALBUMIN SERPL ELPH-MCNC: 2.9 G/DL — LOW (ref 3.3–5)
ALP SERPL-CCNC: 103 U/L — SIGNIFICANT CHANGE UP (ref 40–120)
ALT FLD-CCNC: 148 U/L — HIGH (ref 4–33)
ANION GAP SERPL CALC-SCNC: 7 MMOL/L — SIGNIFICANT CHANGE UP (ref 7–14)
APPEARANCE UR: CLEAR — SIGNIFICANT CHANGE UP
APTT BLD: 24.5 SEC — LOW (ref 27–36.3)
AST SERPL-CCNC: 104 U/L — HIGH (ref 4–32)
BACTERIA # UR AUTO: NEGATIVE — SIGNIFICANT CHANGE UP
BASOPHILS # BLD AUTO: 0.04 K/UL — SIGNIFICANT CHANGE UP (ref 0–0.2)
BASOPHILS NFR BLD AUTO: 0.4 % — SIGNIFICANT CHANGE UP (ref 0–2)
BILIRUB SERPL-MCNC: 0.4 MG/DL — SIGNIFICANT CHANGE UP (ref 0.2–1.2)
BILIRUB UR-MCNC: NEGATIVE — SIGNIFICANT CHANGE UP
BUN SERPL-MCNC: 12 MG/DL — SIGNIFICANT CHANGE UP (ref 7–23)
CALCIUM SERPL-MCNC: 8.7 MG/DL — SIGNIFICANT CHANGE UP (ref 8.4–10.5)
CHLORIDE SERPL-SCNC: 105 MMOL/L — SIGNIFICANT CHANGE UP (ref 98–107)
CO2 SERPL-SCNC: 26 MMOL/L — SIGNIFICANT CHANGE UP (ref 22–31)
COLOR SPEC: SIGNIFICANT CHANGE UP
CREAT ?TM UR-MCNC: 38 MG/DL — SIGNIFICANT CHANGE UP
CREAT SERPL-MCNC: 0.59 MG/DL — SIGNIFICANT CHANGE UP (ref 0.5–1.3)
DIFF PNL FLD: ABNORMAL
EOSINOPHIL # BLD AUTO: 0.09 K/UL — SIGNIFICANT CHANGE UP (ref 0–0.5)
EOSINOPHIL NFR BLD AUTO: 0.9 % — SIGNIFICANT CHANGE UP (ref 0–6)
EPI CELLS # UR: SIGNIFICANT CHANGE UP
FIBRINOGEN PPP-MCNC: 586 MG/DL — HIGH (ref 290–520)
GLUCOSE SERPL-MCNC: 82 MG/DL — SIGNIFICANT CHANGE UP (ref 70–99)
GLUCOSE UR QL: NEGATIVE — SIGNIFICANT CHANGE UP
HCT VFR BLD CALC: 24.1 % — LOW (ref 34.5–45)
HGB BLD-MCNC: 8.3 G/DL — LOW (ref 11.5–15.5)
IANC: 7.56 K/UL — SIGNIFICANT CHANGE UP (ref 1.5–8.5)
IMM GRANULOCYTES NFR BLD AUTO: 1.2 % — SIGNIFICANT CHANGE UP (ref 0–1.5)
INR BLD: 0.9 RATIO — SIGNIFICANT CHANGE UP (ref 0.88–1.16)
KETONES UR-MCNC: NEGATIVE — SIGNIFICANT CHANGE UP
LDH SERPL L TO P-CCNC: 424 U/L — HIGH (ref 135–225)
LEUKOCYTE ESTERASE UR-ACNC: NEGATIVE — SIGNIFICANT CHANGE UP
LYMPHOCYTES # BLD AUTO: 1.83 K/UL — SIGNIFICANT CHANGE UP (ref 1–3.3)
LYMPHOCYTES # BLD AUTO: 18 % — SIGNIFICANT CHANGE UP (ref 13–44)
MCHC RBC-ENTMCNC: 30.2 PG — SIGNIFICANT CHANGE UP (ref 27–34)
MCHC RBC-ENTMCNC: 34.4 GM/DL — SIGNIFICANT CHANGE UP (ref 32–36)
MCV RBC AUTO: 87.6 FL — SIGNIFICANT CHANGE UP (ref 80–100)
MONOCYTES # BLD AUTO: 0.53 K/UL — SIGNIFICANT CHANGE UP (ref 0–0.9)
MONOCYTES NFR BLD AUTO: 5.2 % — SIGNIFICANT CHANGE UP (ref 2–14)
NEUTROPHILS # BLD AUTO: 7.56 K/UL — HIGH (ref 1.8–7.4)
NEUTROPHILS NFR BLD AUTO: 74.3 % — SIGNIFICANT CHANGE UP (ref 43–77)
NITRITE UR-MCNC: NEGATIVE — SIGNIFICANT CHANGE UP
NRBC # BLD: 0 /100 WBCS — SIGNIFICANT CHANGE UP
NRBC # FLD: 0 K/UL — SIGNIFICANT CHANGE UP
PH UR: 8 — SIGNIFICANT CHANGE UP (ref 5–8)
PLATELET # BLD AUTO: 123 K/UL — LOW (ref 150–400)
POTASSIUM SERPL-MCNC: 4.3 MMOL/L — SIGNIFICANT CHANGE UP (ref 3.5–5.3)
POTASSIUM SERPL-SCNC: 4.3 MMOL/L — SIGNIFICANT CHANGE UP (ref 3.5–5.3)
PROT ?TM UR-MCNC: 8 MG/DL — SIGNIFICANT CHANGE UP
PROT SERPL-MCNC: 5.2 G/DL — LOW (ref 6–8.3)
PROT UR-MCNC: ABNORMAL
PROT/CREAT UR-RTO: 0.2 RATIO — SIGNIFICANT CHANGE UP (ref 0–0.2)
PROTHROM AB SERPL-ACNC: 10.4 SEC — LOW (ref 10.6–13.6)
RBC # BLD: 2.75 M/UL — LOW (ref 3.8–5.2)
RBC # FLD: 15.2 % — HIGH (ref 10.3–14.5)
RBC CASTS # UR COMP ASSIST: 2 /HPF — SIGNIFICANT CHANGE UP (ref 0–4)
SODIUM SERPL-SCNC: 138 MMOL/L — SIGNIFICANT CHANGE UP (ref 135–145)
SP GR SPEC: 1.01 — SIGNIFICANT CHANGE UP (ref 1–1.05)
URATE SERPL-MCNC: 2.9 MG/DL — SIGNIFICANT CHANGE UP (ref 2.5–7)
UROBILINOGEN FLD QL: SIGNIFICANT CHANGE UP
WBC # BLD: 10.17 K/UL — SIGNIFICANT CHANGE UP (ref 3.8–10.5)
WBC # FLD AUTO: 10.17 K/UL — SIGNIFICANT CHANGE UP (ref 3.8–10.5)
WBC UR QL: 1 /HPF — SIGNIFICANT CHANGE UP (ref 0–5)

## 2021-10-16 RX ORDER — IBUPROFEN 200 MG
600 TABLET ORAL EVERY 6 HOURS
Refills: 0 | Status: DISCONTINUED | OUTPATIENT
Start: 2021-10-16 | End: 2021-10-17

## 2021-10-16 RX ADMIN — SIMETHICONE 80 MILLIGRAM(S): 80 TABLET, CHEWABLE ORAL at 11:24

## 2021-10-16 RX ADMIN — Medication 600 MILLIGRAM(S): at 03:37

## 2021-10-16 RX ADMIN — Medication 600 MILLIGRAM(S): at 04:15

## 2021-10-16 RX ADMIN — Medication 600 MILLIGRAM(S): at 11:24

## 2021-10-16 RX ADMIN — MAGNESIUM HYDROXIDE 30 MILLILITER(S): 400 TABLET, CHEWABLE ORAL at 05:49

## 2021-10-16 RX ADMIN — Medication 600 MILLIGRAM(S): at 12:30

## 2021-10-16 RX ADMIN — Medication 600 MILLIGRAM(S): at 21:21

## 2021-10-16 RX ADMIN — SIMETHICONE 80 MILLIGRAM(S): 80 TABLET, CHEWABLE ORAL at 20:37

## 2021-10-16 RX ADMIN — SIMETHICONE 80 MILLIGRAM(S): 80 TABLET, CHEWABLE ORAL at 03:37

## 2021-10-16 RX ADMIN — Medication 600 MILLIGRAM(S): at 20:37

## 2021-10-16 NOTE — PROGRESS NOTE ADULT - ASSESSMENT
30y  POD#3 status post  pLTCS for HELLP Syndrome. Patient is stable and meeting all post op milestones.     #Neuro: pain well controlled w/ current regimen, epidural removed, pt encouraged to take motrin prn and oxy for breakthrough pain only   #CV: Hemodynamically stable currently, AM cbc, H/H 9.7/28.2, Plt 99, status post 3plt, 1U pRBCs 10/14, LFTs now still downtrending 116/163 from 332/248 - Follow up AM Labs  #Pulm: ROS and PE unremarkable, pt encouraged to use incentive spirometer  #ID: Afebrile, no acute concerns  #GI: tolerating reg diet w/o n/v. Simethicone and ambulation encouraged to promote flatus   #: UOP adequate, voiding spontaneously   #FEN: SLIV, reg    #Heme: SCDs while in bed, and early ambulation encouraged for DVT prophylaxis    RFrankel PGY3

## 2021-10-16 NOTE — PROGRESS NOTE ADULT - SUBJECTIVE AND OBJECTIVE BOX
Postpartum Note,  Section  She is a  30y woman who is now post-operative day: 4    Subjective:  The patient feels well.  She is ambulating.   She is tolerating regular diet.  She denies nausea and vomiting.  She is voiding.  Her pain is controlled.  She reports normal postpartum bleeding.        Physical exam:    Vital Signs Last 24 Hrs  T(C): 36.9 (16 Oct 2021 06:40), Max: 37.2 (15 Oct 2021 14:03)  T(F): 98.4 (16 Oct 2021 06:40), Max: 99 (15 Oct 2021 14:03)  HR: 78 (16 Oct 2021 06:40) (78 - 100)  BP: 121/71 (16 Oct 2021 06:40) (105/67 - 134/81)  BP(mean): 76 (15 Oct 2021 10:00) (76 - 89)  RR: 18 (16 Oct 2021 06:40) (16 - 20)  SpO2: 98% (16 Oct 2021 06:40) (98% - 100%)    Gen: NAD  Breast: Soft, nontender, not engorged.  Abdomen: Soft, nontender, no distension , firm uterine fundus at umbilicus.  Incision: Clean, dry, and intact with steri strips  Pelvic: Normal lochia noted  Ext: No calf tenderness    LABS:                        8.3    10.17 )-----------( 123      ( 16 Oct 2021 07:17 )             24.1                         9.7    14.46 )-----------( 99       ( 15 Oct 2021 02:39 )             28.2                         9.4    15.09 )-----------( 101      ( 14 Oct 2021 14:31 )             26.8                         9.2    14.31 )-----------( 88       ( 14 Oct 2021 08:23 )             26.6       10-15-21 @ 02:39      136  |  102  |  11  ----------------------------<  102<H>  4.4   |  26  |  0.72    10-14-21 @ 14:31      137  |  102  |  8   ----------------------------<  128<H>  3.6   |  27  |  0.77    10-14-21 @ 08:23      140  |  106  |  7   ----------------------------<  93  4.6   |  27  |  0.67    10-14-21 @ 03:10      134<L>  |  101  |  8   ----------------------------<  181<H>  3.9   |  22  |  0.81    10-13-21 @ 20:03      136  |  104  |  9   ----------------------------<  153<H>  4.1   |  17<L>  |  0.87    10-13-21 @ 15:46      134<L>  |  102  |  10  ----------------------------<  137<H>  4.0   |  13<L>  |  0.93    10-13-21 @ 11:08      132<L>  |  100  |  11  ----------------------------<  77  4.4   |  18<L>  |  0.88        Ca    7.9<L>      15 Oct 2021 02:39  Ca    7.0<L>      14 Oct 2021 14:31  Ca    7.0<L>      14 Oct 2021 08:23  Ca    6.1<LL>      14 Oct 2021 03:10  Ca    6.5<LL>      13 Oct 2021 20:03  Ca    7.0<L>      13 Oct 2021 15:46  Ca    8.3<L>      13 Oct 2021 11:08  Phos  2.2<L>     10-14  Phos  3.2     10-14  Mg     3.20<H>     10-15  Mg     5.50<H>     10-14  Mg     5.30<H>     10-14  Mg     7.9     10-14  Mg     6.60<H>     10-13  Mg     6.50<H>     10-13  Mg     5.60<H>     10-13    TPro  5.5<L>  /  Alb  3.0<L>  /  TBili  0.4  /  DBili  x   /  AST  116<H>  /  ALT  163<H>  /  AlkPhos  114  10-15-21 @ 02:39  TPro  6.0  /  Alb  3.6  /  TBili  0.7  /  DBili  x   /  AST  171<H>  /  ALT  200<H>  /  AlkPhos  124<H>  10-14-21 @ 14:31  TPro  5.7<L>  /  Alb  3.4  /  TBili  0.8  /  DBili  x   /  AST  212<H>  /  ALT  207<H>  /  AlkPhos  118  10-14-21 @ 08:23  TPro  5.0<L>  /  Alb  3.1<L>  /  TBili  0.8  /  DBili  x   /  AST  250<H>  /  ALT  202<H>  /  AlkPhos  110  10-14-21 @ 03:10  TPro  5.1<L>  /  Alb  3.0<L>  /  TBili  0.8  /  DBili  x   /  AST  332<H>  /  ALT  248<H>  /  AlkPhos  118  10-13-21 @ 20:03  TPro  5.2<L>  /  Alb  3.2<L>  /  TBili  0.8  /  DBili  x   /  AST  343<H>  /  ALT  266<H>  /  AlkPhos  129<H>  10-13-21 @ 15:46  TPro  6.7  /  Alb  4.0  /  TBili  0.8  /  DBili  x   /  AST  264<H>  /  ALT  245<H>  /  AlkPhos  160<H>  10-13-21 @ 11:08        Allergies    No Known Allergies    Intolerances      MEDICATIONS  (STANDING):  diphtheria/tetanus/pertussis (acellular) Vaccine (ADAcel) 0.5 milliLiter(s) IntraMuscular once  heparin   Injectable 5000 Unit(s) SubCutaneous every 12 hours  ibuprofen  Tablet. 600 milliGRAM(s) Oral every 6 hours    MEDICATIONS  (PRN):  diphenhydrAMINE 25 milliGRAM(s) Oral every 6 hours PRN Pruritus  lanolin Ointment 1 Application(s) Topical every 6 hours PRN Sore Nipples  magnesium hydroxide Suspension 30 milliLiter(s) Oral two times a day PRN Constipation  oxyCODONE    IR 5 milliGRAM(s) Oral every 3 hours PRN breakthrough pain  simethicone 80 milliGRAM(s) Chew every 4 hours PRN Gas        Assessment and Plan  POD #4 s/p  section PEC  Doing well.  Encourage ambulation.  BPs well controlled without meds  dc labs  Plan for discharge tomorrow

## 2021-10-16 NOTE — PROGRESS NOTE ADULT - SUBJECTIVE AND OBJECTIVE BOX
OB Postpartum Note:  Delivery, POD#3    S: 29yo POD#3 s/p LTCS for HELLP syndrome. The patient feels well.  Pain is well controlled with Motrin only. She is tolerating a regular diet and passing flatus. States her abdominal distention is improving and she feels better. She is voiding spontaneously, and ambulating without difficulty. Denies CP/SOB. Denies lightheadedness/dizziness. Denies N/V. Denies heavy vaginal bleeding.    O:  Vitals:  Vital Signs Last 24 Hrs  T(C): 36.9 (16 Oct 2021 06:40), Max: 37.2 (15 Oct 2021 14:03)  T(F): 98.4 (16 Oct 2021 06:40), Max: 99 (15 Oct 2021 14:03)  HR: 78 (16 Oct 2021 06:40) (78 - 100)  BP: 121/71 (16 Oct 2021 06:40) (105/67 - 134/81)  BP(mean): 76 (15 Oct 2021 10:00) (76 - 89)  RR: 18 (16 Oct 2021 06:40) (16 - 20)  SpO2: 98% (16 Oct 2021 06:40) (98% - 100%)    MEDICATIONS  (STANDING):  diphtheria/tetanus/pertussis (acellular) Vaccine (ADAcel) 0.5 milliLiter(s) IntraMuscular once  heparin   Injectable 5000 Unit(s) SubCutaneous every 12 hours  ibuprofen  Tablet. 600 milliGRAM(s) Oral every 6 hours    MEDICATIONS  (PRN):  diphenhydrAMINE 25 milliGRAM(s) Oral every 6 hours PRN Pruritus  lanolin Ointment 1 Application(s) Topical every 6 hours PRN Sore Nipples  magnesium hydroxide Suspension 30 milliLiter(s) Oral two times a day PRN Constipation  oxyCODONE    IR 5 milliGRAM(s) Oral every 3 hours PRN breakthrough pain  simethicone 80 milliGRAM(s) Chew every 4 hours PRN Gas      LABS:  Blood type: O Positive  Rubella IgG: RPR: Negative                          9.7<L>   14.46<H> >-----------< 99<L>    ( 10-15 @ 02:39 )             28.2<L>                        9.4<L>   15.09<H> >-----------< 101<L>    ( 10-14 @ 14:31 )             26.8<L>                        9.2<L>   14.31<H> >-----------< 88<L>    ( 10-14 @ 08:23 )             26.6<L>                        8.2<L>   12.61<H> >-----------< 79<L>    ( 10-14 @ 03:10 )             24.0<L>                        7.6<L>   11.58<H> >-----------< 34<L>    ( 10-13 @ 20:03 )             22.8<L>                        8.3<L>   12.09<H> >-----------< 47<L>    ( 10-13 @ 15:46 )             25.9<L>                        10.2<L>   9.26 >-----------< 47<L>    ( 10-13 @ 11:08 )             31.9<L>    10-15-21 @ 02:39      136  |  102  |  11  ----------------------------<  102<H>  4.4   |  26  |  0.72    10-14-21 @ 14:31      137  |  102  |  8   ----------------------------<  128<H>  3.6   |  27  |  0.77    10-14-21 @ 08:23      140  |  106  |  7   ----------------------------<  93  4.6   |  27  |  0.67    10-14-21 @ 03:10      134<L>  |  101  |  8   ----------------------------<  181<H>  3.9   |  22  |  0.81    10-13-21 @ 20:03      136  |  104  |  9   ----------------------------<  153<H>  4.1   |  17<L>  |  0.87    10-13-21 @ 15:46      134<L>  |  102  |  10  ----------------------------<  137<H>  4.0   |  13<L>  |  0.93    10-13-21 @ 11:08      132<L>  |  100  |  11  ----------------------------<  77  4.4   |  18<L>  |  0.88        Ca    7.9<L>      15 Oct 2021 02:39  Ca    7.0<L>      14 Oct 2021 14:31  Ca    7.0<L>      14 Oct 2021 08:23  Ca    6.1<LL>      14 Oct 2021 03:10  Ca    6.5<LL>      13 Oct 2021 20:03  Ca    7.0<L>      13 Oct 2021 15:46  Ca    8.3<L>      13 Oct 2021 11:08  Phos  2.2<L>     10-  Phos  3.2     10-14  Mg     3.20<H>     10-15  Mg     5.50<H>     10-14  Mg     5.30<H>     10-14  Mg     7.9     10-14  Mg     6.60<H>     10-13  Mg     6.50<H>     10-13  Mg     5.60<H>     10-    TPro  5.5<L>  /  Alb  3.0<L>  /  TBili  0.4  /  DBili  x   /  AST  116<H>  /  ALT  163<H>  /  AlkPhos  114  10-15-21 @ 02:39  TPro  6.0  /  Alb  3.6  /  TBili  0.7  /  DBili  x   /  AST  171<H>  /  ALT  200<H>  /  AlkPhos  124<H>  10-14-21 @ 14:31  TPro  5.7<L>  /  Alb  3.4  /  TBili  0.8  /  DBili  x   /  AST  212<H>  /  ALT  207<H>  /  AlkPhos  118  10-14-21 @ 08:23  TPro  5.0<L>  /  Alb  3.1<L>  /  TBili  0.8  /  DBili  x   /  AST  250<H>  /  ALT  202<H>  /  AlkPhos  110  10-14-21 @ 03:10  TPro  5.1<L>  /  Alb  3.0<L>  /  TBili  0.8  /  DBili  x   /  AST  332<H>  /  ALT  248<H>  /  AlkPhos  118  10-13-21 @ 20:03  TPro  5.2<L>  /  Alb  3.2<L>  /  TBili  0.8  /  DBili  x   /  AST  343<H>  /  ALT  266<H>  /  AlkPhos  129<H>  10-13-21 @ 15:46  TPro  6.7  /  Alb  4.0  /  TBili  0.8  /  DBili  x   /  AST  264<H>  /  ALT  245<H>  /  AlkPhos  160<H>  10-13-21 @ 11:08        Physical Exam:  General: NAD  Abdomen: Soft, nontender, nondistended  Incision: C/D/I   Ext: No pain or swelling

## 2021-10-17 VITALS
TEMPERATURE: 98 F | OXYGEN SATURATION: 99 % | HEART RATE: 76 BPM | RESPIRATION RATE: 16 BRPM | SYSTOLIC BLOOD PRESSURE: 124 MMHG | DIASTOLIC BLOOD PRESSURE: 68 MMHG

## 2021-10-17 RX ADMIN — Medication 600 MILLIGRAM(S): at 13:11

## 2021-10-17 RX ADMIN — Medication 600 MILLIGRAM(S): at 02:30

## 2021-10-17 RX ADMIN — Medication 600 MILLIGRAM(S): at 15:12

## 2021-10-17 RX ADMIN — Medication 600 MILLIGRAM(S): at 03:15

## 2021-10-17 NOTE — PROGRESS NOTE ADULT - SUBJECTIVE AND OBJECTIVE BOX
OB Postpartum Note:  Delivery, POD#4    S: 31yo POD#4 s/p LTCS for HELLP syndrome. The patient feels well.  Pain is well controlled with Motrin only. She is tolerating a regular diet and passing flatus. States her abdominal distention is improving and she feels better. She is voiding spontaneously, and ambulating without difficulty. Denies CP/SOB. Denies lightheadedness/dizziness. Denies N/V. Denies heavy vaginal bleeding.      O:  Vitals:  Vital Signs Last 24 Hrs  T(C): 36.3 (17 Oct 2021 02:10), Max: 37 (16 Oct 2021 10:14)  T(F): 97.4 (17 Oct 2021 02:10), Max: 98.6 (16 Oct 2021 10:14)  HR: 76 (17 Oct 2021 02:10) (76 - 100)  BP: 127/80 (17 Oct 2021 02:10) (118/66 - 132/86)  BP(mean): --  RR: 17 (16 Oct 2021 21:25) (17 - 18)  SpO2: 98% (17 Oct 2021 02:10) (98% - 100%)    MEDICATIONS  (STANDING):  diphtheria/tetanus/pertussis (acellular) Vaccine (ADAcel) 0.5 milliLiter(s) IntraMuscular once  heparin   Injectable 5000 Unit(s) SubCutaneous every 12 hours  ibuprofen  Tablet. 600 milliGRAM(s) Oral every 6 hours    MEDICATIONS  (PRN):  diphenhydrAMINE 25 milliGRAM(s) Oral every 6 hours PRN Pruritus  lanolin Ointment 1 Application(s) Topical every 6 hours PRN Sore Nipples  magnesium hydroxide Suspension 30 milliLiter(s) Oral two times a day PRN Constipation  oxyCODONE    IR 5 milliGRAM(s) Oral every 3 hours PRN breakthrough pain  simethicone 80 milliGRAM(s) Chew every 4 hours PRN Gas      LABS:  Blood type: O Positive  Rubella IgG: RPR: Negative                          8.3<L>   10.17 >-----------< 123<L>    ( 10-16 @ 07:17 )             24.1<L>                        9.7<L>   14.46<H> >-----------< 99<L>    ( 10-15 @ 02:39 )             28.2<L>                        9.4<L>   15.09<H> >-----------< 101<L>    ( 10-14 @ 14:31 )             26.8<L>                        9.2<L>   14.31<H> >-----------< 88<L>    ( 10-14 @ 08:23 )             26.6<L>    10-16-21 @ 07:17      138  |  105  |  12  ----------------------------<  82  4.3   |  26  |  0.59    10-15-21 @ 02:39      136  |  102  |  11  ----------------------------<  102<H>  4.4   |  26  |  0.72    10-14-21 @ 14:31      137  |  102  |  8   ----------------------------<  128<H>  3.6   |  27  |  0.77    10-14-21 @ 08:23      140  |  106  |  7   ----------------------------<  93  4.6   |  27  |  0.67        Ca    8.7      16 Oct 2021 07:17  Ca    7.9<L>      15 Oct 2021 02:39  Ca    7.0<L>      14 Oct 2021 14:31  Ca    7.0<L>      14 Oct 2021 08:23  Phos  2.2<L>     10-14  Mg     3.20<H>     10-15  Mg     5.50<H>     10-14  Mg     5.30<H>     10-14    TPro  5.2<L>  /  Alb  2.9<L>  /  TBili  0.4  /  DBili  x   /  AST  104<H>  /  ALT  148<H>  /  AlkPhos  103  10-16-21 @ 07:17  TPro  5.5<L>  /  Alb  3.0<L>  /  TBili  0.4  /  DBili  x   /  AST  116<H>  /  ALT  163<H>  /  AlkPhos  114  10-15-21 @ 02:39  TPro  6.0  /  Alb  3.6  /  TBili  0.7  /  DBili  x   /  AST  171<H>  /  ALT  200<H>  /  AlkPhos  124<H>  10-14-21 @ 14:31  TPro  5.7<L>  /  Alb  3.4  /  TBili  0.8  /  DBili  x   /  AST  212<H>  /  ALT  207<H>  /  AlkPhos  118  10-14-21 @ 08:23    Physical Exam:  General: NAD  Abdomen: Soft, nontender, nondistended  Incision: C/D/I   Ext: No pain or swelling

## 2021-10-17 NOTE — PROGRESS NOTE ADULT - ASSESSMENT
30y  POD#4 status post  pLTCS for HELLP Syndrome. Patient is stable and meeting all post op milestones.     #Neuro: pain well controlled w/ current regimen, epidural removed, pt encouraged to take motrin prn and oxy for breakthrough pain only   #CV: Hemodynamically stable currently, AM cbc, H/H 9.7/28.2, Plt 99, status post 3plt, 1U pRBCs 10/14, LFTs now still downtrending  #Pulm: ROS and PE unremarkable, pt encouraged to use incentive spirometer  #ID: Afebrile, no acute concerns  #GI: tolerating reg diet w/o n/v. Simethicone and ambulation encouraged to promote flatus   #: UOP adequate, voiding spontaneously   #FEN: SLIV, reg    #Heme: SCDs while in bed, and early ambulation encouraged for DVT prophylaxis    Arturo Dill PGY3

## 2021-10-17 NOTE — PROGRESS NOTE ADULT - SUBJECTIVE AND OBJECTIVE BOX
Postpartum Note,  Section  She is a  30y woman who is now post-operative day:4    Subjective:  The patient feels well.  She is ambulating.   She is tolerating regular diet.  She denies nausea and vomiting.  She is voiding.  Her pain is controlled.  She reports normal postpartum bleeding    Physical exam:    Vital Signs Last 24 Hrs  T(C): 36.3 (17 Oct 2021 02:10), Max: 37 (16 Oct 2021 10:14)  T(F): 97.4 (17 Oct 2021 02:10), Max: 98.6 (16 Oct 2021 10:14)  HR: 76 (17 Oct 2021 02:10) (76 - 100)  BP: 127/80 (17 Oct 2021 02:10) (118/66 - 132/86)  BP(mean): --  RR: 17 (16 Oct 2021 21:25) (17 - 18)  SpO2: 98% (17 Oct 2021 02:10) (98% - 100%)    Gen: NAD  Breast: Soft, nontender, not engorged.  Abdomen: Soft, nontender, no distension , firm uterine fundus at umbilicus.  Incision: Clean, dry, and intact with dermabond  Pelvic: Normal lochia noted  Ext: No calf tenderness    LABS:                        8.3    10.17 )-----------( 123      ( 16 Oct 2021 07:17 )             24.1       10-16-21 @ 07:17      138  |  105  |  12  ----------------------------<  82  4.3   |  26  |  0.59    10-15-21 @ 02:39      136  |  102  |  11  ----------------------------<  102<H>  4.4   |  26  |  0.72    10-14-21 @ 14:31      137  |  102  |  8   ----------------------------<  128<H>  3.6   |  27  |  0.77    10-14-21 @ 08:23      140  |  106  |  7   ----------------------------<  93  4.6   |  27  |  0.67        Ca    8.7      16 Oct 2021 07:17  Ca    7.9<L>      15 Oct 2021 02:39  Ca    7.0<L>      14 Oct 2021 14:31  Ca    7.0<L>      14 Oct 2021 08:23  Phos  2.2<L>     10-14  Mg     3.20<H>     10-15  Mg     5.50<H>     10-14  Mg     5.30<H>     10-14    TPro  5.2<L>  /  Alb  2.9<L>  /  TBili  0.4  /  DBili  x   /  AST  104<H>  /  ALT  148<H>  /  AlkPhos  103  10-16-21 @ 07:17  TPro  5.5<L>  /  Alb  3.0<L>  /  TBili  0.4  /  DBili  x   /  AST  116<H>  /  ALT  163<H>  /  AlkPhos  114  10-15-21 @ 02:39  TPro  6.0  /  Alb  3.6  /  TBili  0.7  /  DBili  x   /  AST  171<H>  /  ALT  200<H>  /  AlkPhos  124<H>  10-14-21 @ 14:31  TPro  5.7<L>  /  Alb  3.4  /  TBili  0.8  /  DBili  x   /  AST  212<H>  /  ALT  207<H>  /  AlkPhos  118  10-14-21 @ 08:23        Allergies    No Known Allergies    Intolerances      MEDICATIONS  (STANDING):  diphtheria/tetanus/pertussis (acellular) Vaccine (ADAcel) 0.5 milliLiter(s) IntraMuscular once  heparin   Injectable 5000 Unit(s) SubCutaneous every 12 hours  ibuprofen  Tablet. 600 milliGRAM(s) Oral every 6 hours    MEDICATIONS  (PRN):  diphenhydrAMINE 25 milliGRAM(s) Oral every 6 hours PRN Pruritus  lanolin Ointment 1 Application(s) Topical every 6 hours PRN Sore Nipples  magnesium hydroxide Suspension 30 milliLiter(s) Oral two times a day PRN Constipation  oxyCODONE    IR 5 milliGRAM(s) Oral every 3 hours PRN breakthrough pain  simethicone 80 milliGRAM(s) Chew every 4 hours PRN Gas        Assessment and Plan  POD #4  s/p  section  Doing well.  Encourage ambulation.  D/C home with instructions fu 1 week

## 2021-10-18 ENCOUNTER — NON-APPOINTMENT (OUTPATIENT)
Age: 31
End: 2021-10-18

## 2021-10-18 DIAGNOSIS — R00.2 PALPITATIONS: ICD-10-CM

## 2021-10-20 ENCOUNTER — NON-APPOINTMENT (OUTPATIENT)
Age: 31
End: 2021-10-20

## 2021-10-25 ENCOUNTER — NON-APPOINTMENT (OUTPATIENT)
Age: 31
End: 2021-10-25

## 2021-11-01 ENCOUNTER — NON-APPOINTMENT (OUTPATIENT)
Age: 31
End: 2021-11-01

## 2021-11-08 ENCOUNTER — NON-APPOINTMENT (OUTPATIENT)
Age: 31
End: 2021-11-08

## 2021-11-11 LAB — SURGICAL PATHOLOGY STUDY: SIGNIFICANT CHANGE UP

## 2021-11-17 ENCOUNTER — NON-APPOINTMENT (OUTPATIENT)
Age: 31
End: 2021-11-17

## 2021-11-18 ENCOUNTER — NON-APPOINTMENT (OUTPATIENT)
Age: 31
End: 2021-11-18

## 2021-12-17 NOTE — CHART NOTE - NSCHARTNOTEFT_GEN_A_CORE
R4  Patient transferred to SICU for closer monitoring.  Currently s/p 2u plts, PRBCs being administered now.  Hematology at bedside per SICU consult discussing HELLP with patient. Per Heme, goal hgb >7 and plts >20.  Heme aware that platelets must be increased for epidural to be pulled.    VILLA Zhang PGY4 Former smoker

## 2022-12-03 ENCOUNTER — NON-APPOINTMENT (OUTPATIENT)
Age: 32
End: 2022-12-03

## 2023-03-03 NOTE — DISCHARGE NOTE OB - FUNCTIONAL STATUS DATE
16-Oct-2021 Azelaic Acid Counseling: Patient counseled that medicine may cause skin irritation and to avoid applying near the eyes.  In the event of skin irritation, the patient was advised to reduce the amount of the drug applied or use it less frequently.   The patient verbalized understanding of the proper use and possible adverse effects of azelaic acid.  All of the patient's questions and concerns were addressed.

## 2024-01-15 ENCOUNTER — NON-APPOINTMENT (OUTPATIENT)
Age: 34
End: 2024-01-15

## 2024-04-02 NOTE — OB RN PATIENT PROFILE - PRO BLOOD TYPE INFANT
Patient returning message about her US results.Transferred the call to Loretta in the office who will assist her with scheduling the appt for another US. No further actions needed.    O positive

## 2024-12-30 ENCOUNTER — APPOINTMENT (OUTPATIENT)
Dept: UROLOGY | Facility: CLINIC | Age: 34
End: 2024-12-30
Payer: COMMERCIAL

## 2024-12-30 VITALS
DIASTOLIC BLOOD PRESSURE: 60 MMHG | HEIGHT: 64 IN | OXYGEN SATURATION: 99 % | SYSTOLIC BLOOD PRESSURE: 100 MMHG | WEIGHT: 115 LBS | HEART RATE: 99 BPM | BODY MASS INDEX: 19.63 KG/M2

## 2024-12-30 DIAGNOSIS — Z86.59 PERSONAL HISTORY OF OTHER MENTAL AND BEHAVIORAL DISORDERS: ICD-10-CM

## 2024-12-30 DIAGNOSIS — Z78.9 OTHER SPECIFIED HEALTH STATUS: ICD-10-CM

## 2024-12-30 PROCEDURE — 99204 OFFICE O/P NEW MOD 45 MIN: CPT

## 2024-12-30 RX ORDER — ESCITALOPRAM OXALATE 10 MG/1
10 TABLET, FILM COATED ORAL
Refills: 0 | Status: ACTIVE | COMMUNITY

## 2024-12-30 RX ORDER — SERTRALINE HYDROCHLORIDE 50 MG/1
50 TABLET, FILM COATED ORAL
Refills: 0 | Status: ACTIVE | COMMUNITY

## 2025-01-07 ENCOUNTER — APPOINTMENT (OUTPATIENT)
Dept: MRI IMAGING | Facility: CLINIC | Age: 35
End: 2025-01-07

## 2025-01-07 ENCOUNTER — RESULT REVIEW (OUTPATIENT)
Age: 35
End: 2025-01-07

## 2025-01-10 ENCOUNTER — NON-APPOINTMENT (OUTPATIENT)
Age: 35
End: 2025-01-10

## 2025-01-10 DIAGNOSIS — N36.8 OTHER SPECIFIED DISORDERS OF URETHRA: ICD-10-CM

## 2025-01-13 ENCOUNTER — OUTPATIENT (OUTPATIENT)
Dept: OUTPATIENT SERVICES | Facility: HOSPITAL | Age: 35
LOS: 1 days | End: 2025-01-13
Payer: COMMERCIAL

## 2025-01-13 ENCOUNTER — APPOINTMENT (OUTPATIENT)
Dept: MRI IMAGING | Facility: CLINIC | Age: 35
End: 2025-01-13
Payer: COMMERCIAL

## 2025-01-13 ENCOUNTER — APPOINTMENT (OUTPATIENT)
Dept: MRI IMAGING | Facility: CLINIC | Age: 35
End: 2025-01-13

## 2025-01-13 DIAGNOSIS — N36.8 OTHER SPECIFIED DISORDERS OF URETHRA: ICD-10-CM

## 2025-01-13 DIAGNOSIS — Z90.89 ACQUIRED ABSENCE OF OTHER ORGANS: Chronic | ICD-10-CM

## 2025-01-13 PROCEDURE — A9585: CPT

## 2025-01-13 PROCEDURE — 72197 MRI PELVIS W/O & W/DYE: CPT | Mod: 26

## 2025-01-13 PROCEDURE — 72197 MRI PELVIS W/O & W/DYE: CPT

## 2025-01-14 ENCOUNTER — NON-APPOINTMENT (OUTPATIENT)
Age: 35
End: 2025-01-14

## 2025-01-15 ENCOUNTER — TRANSCRIPTION ENCOUNTER (OUTPATIENT)
Age: 35
End: 2025-01-15

## 2025-01-18 ENCOUNTER — NON-APPOINTMENT (OUTPATIENT)
Age: 35
End: 2025-01-18

## 2025-01-21 ENCOUNTER — APPOINTMENT (OUTPATIENT)
Dept: UROLOGY | Facility: CLINIC | Age: 35
End: 2025-01-21
Payer: COMMERCIAL

## 2025-01-21 DIAGNOSIS — N36.8 OTHER SPECIFIED DISORDERS OF URETHRA: ICD-10-CM

## 2025-01-21 PROCEDURE — 99213 OFFICE O/P EST LOW 20 MIN: CPT | Mod: 95

## 2025-02-11 ENCOUNTER — APPOINTMENT (OUTPATIENT)
Dept: UROGYNECOLOGY | Facility: CLINIC | Age: 35
End: 2025-02-11

## 2025-05-09 NOTE — OB RN TRIAGE NOTE - HEART RATE (BEATS/MIN)
How Severe Is Your Skin Lesion?: moderate Have Your Skin Lesions Been Treated?: not been treated Is This A New Presentation, Or A Follow-Up?: Skin Lesions 74

## 2025-06-16 ENCOUNTER — APPOINTMENT (OUTPATIENT)
Dept: PEDIATRIC MEDICAL GENETICS | Facility: CLINIC | Age: 35
End: 2025-06-16
Payer: COMMERCIAL

## 2025-06-16 PROCEDURE — 96041 GENETIC COUNSELING SVC EA 30: CPT | Mod: 95

## 2025-06-16 RX ORDER — ELASTIC BANDAGE 2"X2.2YD
BANDAGE TOPICAL
Refills: 0 | Status: ACTIVE | COMMUNITY

## 2025-07-15 ENCOUNTER — APPOINTMENT (OUTPATIENT)
Dept: UROLOGY | Facility: CLINIC | Age: 35
End: 2025-07-15